# Patient Record
Sex: FEMALE | Race: WHITE | Employment: OTHER | URBAN - METROPOLITAN AREA
[De-identification: names, ages, dates, MRNs, and addresses within clinical notes are randomized per-mention and may not be internally consistent; named-entity substitution may affect disease eponyms.]

---

## 2017-11-08 ENCOUNTER — HOSPITAL ENCOUNTER (OUTPATIENT)
Dept: WOMENS IMAGING | Age: 67
Discharge: HOME | End: 2017-11-08
Attending: OBSTETRICS & GYNECOLOGY | Admitting: OBSTETRICS & GYNECOLOGY

## 2017-11-08 DIAGNOSIS — Z12.31 VISIT FOR SCREENING MAMMOGRAM: ICD-10-CM

## 2018-12-19 ENCOUNTER — HOSPITAL ENCOUNTER (OUTPATIENT)
Dept: WOMENS IMAGING | Age: 68
Discharge: HOME | End: 2018-12-19
Payer: MEDICARE

## 2018-12-19 DIAGNOSIS — Z12.39 BREAST CANCER SCREENING: ICD-10-CM

## 2018-12-19 PROCEDURE — 77063 BREAST TOMOSYNTHESIS BI: CPT

## 2020-05-25 ENCOUNTER — HOSPITAL ENCOUNTER (EMERGENCY)
Age: 70
Discharge: HOME | End: 2020-05-25
Payer: MEDICARE

## 2020-05-25 VITALS
SYSTOLIC BLOOD PRESSURE: 144 MMHG | RESPIRATION RATE: 12 BRPM | BODY MASS INDEX: 33.63 KG/M2 | HEART RATE: 82 BPM | DIASTOLIC BLOOD PRESSURE: 81 MMHG | WEIGHT: 189.82 LBS | OXYGEN SATURATION: 96 % | HEIGHT: 63 IN | TEMPERATURE: 99.5 F

## 2020-05-25 PROCEDURE — 99282 EMERGENCY DEPT VISIT SF MDM: CPT

## 2020-05-25 RX ORDER — METOCLOPRAMIDE 10 MG/1
10 TABLET ORAL
COMMUNITY
Start: 2019-04-17

## 2020-05-25 RX ORDER — CLOTRIMAZOLE 1 %
CREAM (GRAM) TOPICAL
Qty: 1 TUBE | Refills: 0 | Status: SHIPPED | OUTPATIENT
Start: 2020-05-25

## 2020-05-25 RX ORDER — CLINDAMYCIN HYDROCHLORIDE 150 MG/1
450 CAPSULE ORAL 3 TIMES DAILY
Qty: 90 CAPSULE | Refills: 0 | Status: SHIPPED | OUTPATIENT
Start: 2020-05-25 | End: 2020-06-04

## 2020-05-25 RX ORDER — NYSTATIN 100000 [USP'U]/G
POWDER TOPICAL
Qty: 1 BOTTLE | Refills: 0 | Status: SHIPPED | OUTPATIENT
Start: 2020-05-25

## 2020-05-25 RX ORDER — EMPAGLIFLOZIN 25 MG/1
25 TABLET, FILM COATED ORAL DAILY
COMMUNITY
Start: 2018-08-09 | End: 2020-06-19

## 2020-05-25 ASSESSMENT — ENCOUNTER SYMPTOMS
VOMITING: 0
NAUSEA: 0

## 2020-05-25 NOTE — ED PROVIDER NOTES
[address] [facility] ENCOUNTER        Pt Name: Gayle Hanson  MRN: [id number]  [place] 1950  Date of evaluation: 5/25/2020  Provider: JANETT Velazquez    This patient was not seen and evaluated by the attending physician No att. providers found. CHIEF COMPLAINT       Chief Complaint   Patient presents with    Rash     under right breast         HISTORY OF PRESENT ILLNESS  (Location/Symptom, Timing/Onset, Context/Setting, Quality, Duration,Modifying Factors, Severity.)   Gayle Hanson is a 77 y.o. female who presents to the emergencydepartment for itching rash under her right breast for the past 2 weeks. Denies prior episodes. Only is painful when she touches it. Reports it all started after she scratched a lesion on her skin that has been there for a long time. She denies any drainage. She denies fever or chills nausea or vomiting. She denies any new medications, soaps, products, or detergents. Nursing Notes were reviewed and I agree. OF SYSTEMS    (2-9 systems for level 4, 10 or more for level 5)     Review of Systems   Constitutional: Negative for chills and fever. Gastrointestinal: Negative for nausea and vomiting. Skin: Positive for rash. +itching  +skin lesion     Except as noted above the remainder of the review of systems was reviewed and negative. PAST MEDICAL HISTORY         Diagnosis Date    CAD (coronary artery disease)     Diabetes mellitus ([facility] [address]     Hypertension        SURGICAL HISTORY         Procedure Laterality Date    CORONARY ANGIOPLASTY WITH STENT PLACEMENT      TONSILLECTOMY         CURRENT MEDICATIONS       Previous Medications    ASPIRIN 81 MG CHEWABLE TABLET    Take 81 mg by mouth daily.     ATORVASTATIN (LIPITOR) 80 MG TABLET    Take 80 mg by mouth daily    DULAGLUTIDE 0.75 MG/0.5ML SOPN    Inject 0.75 mg into the skin every 7 days    EMPAGLIFLOZIN (JARDIANCE) 25 MG TABLET    Take 25 mg

## 2021-05-03 ENCOUNTER — HOSPITAL ENCOUNTER (OUTPATIENT)
Dept: WOMENS IMAGING | Age: 71
Discharge: HOME | End: 2021-05-03
Payer: MEDICARE

## 2021-05-03 DIAGNOSIS — Z12.31 BREAST CANCER SCREENING BY MAMMOGRAM: ICD-10-CM

## 2021-05-03 PROCEDURE — 77063 BREAST TOMOSYNTHESIS BI: CPT

## 2022-05-10 ENCOUNTER — HOSPITAL ENCOUNTER (OUTPATIENT)
Dept: WOMENS IMAGING | Age: 72
Discharge: HOME | End: 2022-05-10
Payer: MEDICARE

## 2022-05-10 DIAGNOSIS — Z12.31 VISIT FOR SCREENING MAMMOGRAM: ICD-10-CM

## 2022-05-10 PROCEDURE — 77063 BREAST TOMOSYNTHESIS BI: CPT

## 2022-07-27 ENCOUNTER — HOSPITAL ENCOUNTER (EMERGENCY)
Age: 72
Discharge: HOME | End: 2022-07-27
Payer: MEDICARE

## 2022-07-27 ENCOUNTER — APPOINTMENT (OUTPATIENT)
Dept: GENERAL RADIOLOGY | Age: 72
End: 2022-07-27
Payer: MEDICARE

## 2022-07-27 VITALS
WEIGHT: 177.47 LBS | SYSTOLIC BLOOD PRESSURE: 141 MMHG | BODY MASS INDEX: 31.45 KG/M2 | HEART RATE: 80 BPM | DIASTOLIC BLOOD PRESSURE: 84 MMHG | RESPIRATION RATE: 18 BRPM | HEIGHT: 63 IN | OXYGEN SATURATION: 97 % | TEMPERATURE: 99 F

## 2022-07-27 DIAGNOSIS — S60.222A CONTUSION OF LEFT HAND, INITIAL ENCOUNTER: ICD-10-CM

## 2022-07-27 DIAGNOSIS — S20.212A RIB CONTUSION, LEFT, INITIAL ENCOUNTER: ICD-10-CM

## 2022-07-27 DIAGNOSIS — S32.030A CLOSED COMPRESSION FRACTURE OF L3 LUMBAR VERTEBRA, INITIAL ENCOUNTER (HCC): Primary | ICD-10-CM

## 2022-07-27 PROCEDURE — 72100 X-RAY EXAM L-S SPINE 2/3 VWS: CPT

## 2022-07-27 PROCEDURE — 6370000000 HC RX 637 (ALT 250 FOR IP): Performed by: PHYSICIAN ASSISTANT

## 2022-07-27 PROCEDURE — 73130 X-RAY EXAM OF HAND: CPT

## 2022-07-27 PROCEDURE — 71101 X-RAY EXAM UNILAT RIBS/CHEST: CPT

## 2022-07-27 PROCEDURE — 99283 EMERGENCY DEPT VISIT LOW MDM: CPT

## 2022-07-27 RX ORDER — LIDOCAINE 50 MG/G
1 PATCH TOPICAL DAILY
Qty: 10 PATCH | Refills: 0 | Status: SHIPPED | OUTPATIENT
Start: 2022-07-27 | End: 2022-08-06

## 2022-07-27 RX ORDER — LIDOCAINE 4 G/G
1 PATCH TOPICAL ONCE
Status: DISCONTINUED | OUTPATIENT
Start: 2022-07-27 | End: 2022-07-27 | Stop reason: HOSPADM

## 2022-07-27 RX ORDER — HYDROCODONE BITARTRATE AND ACETAMINOPHEN 5; 325 MG/1; MG/1
1 TABLET ORAL EVERY 6 HOURS PRN
Qty: 20 TABLET | Refills: 0 | Status: SHIPPED | OUTPATIENT
Start: 2022-07-27 | End: 2022-08-01

## 2022-07-27 RX ORDER — HYDROCODONE BITARTRATE AND ACETAMINOPHEN 5; 325 MG/1; MG/1
1 TABLET ORAL ONCE
Status: COMPLETED | OUTPATIENT
Start: 2022-07-27 | End: 2022-07-27

## 2022-07-27 RX ADMIN — HYDROCODONE BITARTRATE AND ACETAMINOPHEN 1 TABLET: 5; 325 TABLET ORAL at 16:26

## 2022-07-27 ASSESSMENT — PAIN SCALES - GENERAL
PAINLEVEL_OUTOF10: 8
PAINLEVEL_OUTOF10: 8

## 2022-07-27 ASSESSMENT — PAIN DESCRIPTION - PAIN TYPE: TYPE: ACUTE PAIN

## 2022-07-27 ASSESSMENT — PAIN DESCRIPTION - FREQUENCY: FREQUENCY: CONTINUOUS

## 2022-07-27 ASSESSMENT — PAIN DESCRIPTION - LOCATION
LOCATION: BACK
LOCATION: BACK

## 2022-07-27 ASSESSMENT — PAIN DESCRIPTION - DESCRIPTORS: DESCRIPTORS: ACHING

## 2022-07-27 ASSESSMENT — PAIN - FUNCTIONAL ASSESSMENT: PAIN_FUNCTIONAL_ASSESSMENT: 0-10

## 2022-07-27 NOTE — DISCHARGE INSTRUCTIONS
Home in stable condition to use cold compresses, use the medications as prescribed, and monitor for gradual improvement over the course of about a week from the onset of symptoms. Call [facility] for further evaluation and treatment as needed. Return to the emergency department for any emergency worsening or concern.

## 2022-07-27 NOTE — ED PROVIDER NOTES
**ADVANCED PRACTICE PROVIDER, I HAVE EVALUATED THIS PATIENT**        [address]      Pt Name: Laureano Martin  SCI:[id number]  [place] 1950  Date of evaluation: 7/27/2022  Provider: Korey Foley PA-C      Chief Complaint:    Chief Complaint   Patient presents with    Fall     Pt states that she fell on her back Saturday while putting up curtains. Did not hit her head          Nursing Notes, Past Medical Hx, Past Surgical Hx, Social Hx, Allergies, and Family Hx were all reviewed and agreed with or any disagreements were addressed in the HPI.    HPI: (Location, Duration, Timing, Severity, Quality, Assoc Sx, Context, Modifying factors)    Chief Complaint of Accidental fall 4 days ago    This is a  71 y.o. female who presents stating that her back is the main complaint. She states that she had an accidental fall from a chair she was standing on on Saturday down to the kitchen floor which is hard. She has had some pain in the low mid back and ribs area since but it seems to be getting a bit worse over the last couple of days. No medication taken for this so far today. It is worse with sneezing or coughing and with changing positions and better at rest.  No pain in the chest.  No radiculopathy to extremities or any extremity acute loss of range of motion or strength or sensation or coordination. She also has bruising on the left hand. She has a small wound on the left shin that they have kept covered since the incident. She lives at home with her  who is also present in the room. Pain currently is moderately severe, achy, worse with changing positions or sneezing and better at rest.  Does not radiate other than as above. No head or neck pain or tenderness.     PastMedical/Surgical History:      Diagnosis Date    CAD (coronary artery disease)     Diabetes mellitus ([facility] [address]     Hypertension          Procedure Laterality Date CORONARY ANGIOPLASTY WITH STENT PLACEMENT      TONSILLECTOMY         Medications:  Previous Medications    ASPIRIN 81 MG CHEWABLE TABLET    Take 81 mg by mouth daily. ATORVASTATIN (LIPITOR) 80 MG TABLET    Take 80 mg by mouth daily    CLOTRIMAZOLE (LOTRIMIN) 1 % CREAM    Apply to affected area twice daily for 2 weeks    DULAGLUTIDE 0.75 MG/0.5ML SOPN    Inject 0.75 mg into the skin every 7 days    HYDROCHLOROTHIAZIDE (HYDRODIURIL) 25 MG TABLET    Take 25 mg by mouth daily. INSULIN GLARGINE, 2 UNIT DIAL, 300 UNIT/ML SOPN    Inject into the skin    LOSARTAN (COZAAR) 25 MG TABLET    Take 100 mg by mouth daily     METFORMIN (GLUCOPHAGE) 500 MG TABLET    Take 500 mg by mouth 2 times daily (with meals). METOCLOPRAMIDE (REGLAN) 10 MG TABLET    Take 10 mg by mouth    NYSTATIN (MYCOSTATIN) 132173 UNIT/GM POWDER    Apply topically 3 times daily to affected area for 2 weeks    OMEPRAZOLE (PRILOSEC) 20 MG CAPSULE    Take 20 mg by mouth daily    PAROXETINE (PAXIL) 20 MG TABLET    Take  by mouth every morning. Indications: not sure of dosage    TOLTERODINE (DETROL LA) 4 MG ER CAPSULE    Take 4 mg by mouth daily         Review of Systems:  (2-9 systems needed)  Review of Systems  Positive history as above, no dizziness or confusion syncope or near syncope headache or vision change neck pain or stiffness. No shortness of breath or chest pain or palpitations. No abdominal pain vomiting or diarrhea. No new onset incontinence or retention of bowel or bladder saddle numbness or tingling or foot drop. \"Positives and Pertinent negatives as per HPI\"    Physical Exam:  Physical Exam  Vitals and nursing note reviewed. Constitutional:       Appearance: Normal appearance. She is not diaphoretic. HENT:      Head: Normocephalic and atraumatic. Right Ear: External ear normal.      Left Ear: External ear normal.      Nose: Nose normal.   Eyes:      General:         Right eye: No discharge. Left eye: No discharge. Conjunctiva/sclera: Conjunctivae normal.   Cardiovascular:      Rate and Rhythm: Normal rate and regular rhythm. Pulses: Normal pulses. Heart sounds: Normal heart sounds. No murmur heard. No gallop. Pulmonary:      Effort: Pulmonary effort is normal. No respiratory distress. Breath sounds: Normal breath sounds. No wheezing, rhonchi or rales. Abdominal:      General: Bowel sounds are normal. There is no distension. Palpations: Abdomen is soft. Tenderness: There is no abdominal tenderness. There is no guarding or rebound. Musculoskeletal:      Cervical back: Normal range of motion and neck supple. Comments: Some moderate upper to mid L-spine tenderness that is nonspecific to a single spinus process, also tenderness in the left paraspinal musculature in this region and into the left ribs posteriorly without palpable deformity or any obvious bruising, no abrasions or erythema, no rash, no subcutaneous crepitus or emphysema. No sciatic notch tenderness. Range of motion mildly limited in the L-spine secondary to pain with trying to bend forward however patient able to sit and stand and change positions relatively easily. Negative straight leg raise bilaterally. Distal pulses 2+ bilaterally in radialis. Mild bruising ulnar aspect dorsally on left hand without palpable deformity. No snuffbox tenderness. Skin:     General: Skin is warm and dry. Capillary Refill: Capillary refill takes less than 2 seconds. Findings: No rash. Neurological:      Mental Status: She is alert and oriented to person, place, and time. Sensory: No sensory deficit. Motor: No weakness.       Coordination: Coordination normal.      Gait: Gait normal.   Psychiatric:         Mood and Affect: Mood normal.         Behavior: Behavior normal.       MEDICAL DECISION MAKING    Vitals:    Vitals:    07/27/22 1332 07/27/22 1626   BP: (!) 141/84    Pulse: 80    Resp: 18 18   Temp: 99 °F (37.2 °C) TempSrc: Oral    SpO2: 97%    Weight: 177 lb 7.5 oz (80.5 kg)    Height: 5' 3\" (1.6 m)        LABS:Labs Reviewed - No data to display     Remainder of labs reviewed and were negative at this time or not returned at the time of this note. RADIOLOGY:   Non-plain film images such as CT, Ultrasound and MRI are read by the radiologist. Judith Chacon PA-C have directly visualized the radiologic plain film image(s) with the below findings:      Interpretation per the Radiologist below, if available at the time of this note:    XR HAND LEFT (MIN 3 VIEWS)   Final Result   No acute osseous abnormality         XR LUMBAR SPINE (2-3 VIEWS)   Final Result   1. Mild superior endplate compression deformity L3 of uncertain age lumbar   spine MRI would be helpful to assess acuity   2. No acute lumbar spine abnormality   3. Multilevel degenerative changes         XR RIBS LEFT INCLUDE CHEST (MIN 3 VIEWS)   Final Result   No acute cardiopulmonary disease. No acute osseous abnormality of the left ribs. XR RIBS LEFT INCLUDE CHEST (MIN 3 VIEWS)    Result Date: 7/27/2022  EXAMINATION: 3 XRAY VIEWS OF THE LEFT RIBS WITH FRONTAL XRAY VIEW OF THE CHEST 7/27/2022 2:42 pm COMPARISON: None. HISTORY: ORDERING SYSTEM PROVIDED HISTORY: Injury TECHNOLOGIST PROVIDED HISTORY: Reason for exam:->Injury Reason for Exam: Injury FINDINGS: The cardiomediastinal silhouette is unremarkable. The lungs are clear. No infiltrate, pleural fluid or pneumothorax. Left rib series demonstrates no acute fracture or other osseous abnormality. No acute cardiopulmonary disease. No acute osseous abnormality of the left ribs.      XR LUMBAR SPINE (2-3 VIEWS)    Result Date: 7/27/2022  EXAMINATION: THREE XRAY VIEWS OF THE LUMBAR SPINE 7/27/2022 2:42 pm COMPARISON: 02/18/2016 HISTORY: ORDERING SYSTEM PROVIDED HISTORY: pain after fall TECHNOLOGIST PROVIDED HISTORY: Reason for exam:->pain after fall Reason for Exam: pain after fall FINDINGS: Alignment is anatomic. Multilevel degenerative disc disease and facet joint arthropathy are noted. Mild superior endplate compression deformity L3.     1. Mild superior endplate compression deformity L3 of uncertain age lumbar spine MRI would be helpful to assess acuity 2. No acute lumbar spine abnormality 3. Multilevel degenerative changes     XR HAND LEFT (MIN 3 VIEWS)    Result Date: 7/27/2022  EXAMINATION: THREE XRAY VIEWS OF THE LEFT HAND 7/27/2022 2:42 pm COMPARISON: None. HISTORY: ORDERING SYSTEM PROVIDED HISTORY: pain/bruising after a fall TECHNOLOGIST PROVIDED HISTORY: Reason for exam:->pain/bruising after a fall Reason for Exam: pain/bruising after a fall FINDINGS: The soft tissues are unremarkable. There is no fracture or dislocation. No focal destructive osseous lesion. No radiopaque foreign body is identified. No acute osseous abnormality          MEDICAL DECISION MAKING / ED COURSE:      PROCEDURES:   Procedures    None    Patient was given:  Medications   lidocaine 4 % external patch 1 patch (1 patch TransDERmal Patch Applied 7/27/22 1626)   HYDROcodone-acetaminophen (NORCO) 5-325 MG per tablet 1 tablet (1 tablet Oral Given 7/27/22 1626)     This patient presents as above and evaluation and treatment is begun here including medications ordered for comfort and appropriate imaging. Imaging now returns as above with no indication of acute cardiopulmonary disease including pneumothorax, no acute rib fracture. Positive for L3 superior endplate compression fracture noted by radiology. I did examined the images and it is definitely new since 2016, our most recent images of these areas. On repeat exam patient still doing pretty well. Medications not given by nursing yet at this time. However no new neurologic deficit or leg weakness or loss of sensation or movement including no saddle numbness or tingling or any radiating pain or symptoms.   Patient's symptoms and radiologic findings and physical exam findings are all correlated with her and the  at the bedside. She does have consistent with acute closed fracture of L3, rib contusion and hand contusion. Conservative home care and outpatient follow-up was also discussed and recommended to patient who verbalizes understanding and agreement with the above and the following discharge home plan. Nursing giving medicines now. The patient tolerated their visit well. I evaluated the patient. The physician was available for consultation as needed. The patient and / or the family were informed of the results of any tests, a time was given to answer questions, a plan was proposed and they agreed with plan. CLINICAL IMPRESSION:  1. Closed compression fracture of L3 lumbar vertebra, initial encounter ([facility] [address]    2. Rib contusion, left, initial encounter    3. Contusion of left hand, initial encounter        DISPOSITION Decision To Discharge 07/27/2022 04:28:14 PM      PATIENT REFERRED TO:  [facility], [place], [address]  Call   For further evaluation and treatment if symptoms not resolving or settling well over the course of about a week from the injury    DISCHARGE MEDICATIONS:  New Prescriptions    HYDROCODONE-ACETAMINOPHEN (NORCO) 5-325 MG PER TABLET    Take 1 tablet by mouth every 6 hours as needed for Pain for up to 5 days. Caution, causes drowsiness. Take appropriate care. LIDOCAINE (LIDODERM) 5 %    Place 1 patch onto the skin in the morning for 10 days. 12 hours on, 12 hours off. .       DISCONTINUED MEDICATIONS:  Discontinued Medications    No medications on file              (Please note the MDM and HPI sections of this note were completed with a voice recognition program.  Efforts were made to edit the dictations but occasionally words are mis-transcribed.)    Electronically signed, Chevy Olivares PA-C,          Chevy Olivares PA-C  07/28/22 [id number]  07/28/22 0038

## 2023-05-13 ENCOUNTER — HOSPITAL ENCOUNTER (OUTPATIENT)
Dept: WOMENS IMAGING | Age: 73
Discharge: HOME | End: 2023-05-13
Payer: MEDICARE

## 2023-05-13 DIAGNOSIS — Z12.31 BREAST CANCER SCREENING BY MAMMOGRAM: ICD-10-CM

## 2023-05-13 PROCEDURE — 77063 BREAST TOMOSYNTHESIS BI: CPT

## 2023-08-31 RX ORDER — FLUOXETINE 20 MG/5ML
SOLUTION ORAL
COMMUNITY
Start: 2022-08-04

## 2023-08-31 RX ORDER — DEXLANSOPRAZOLE 60 MG/1
60 CAPSULE, DELAYED RELEASE ORAL DAILY
COMMUNITY

## 2023-08-31 NOTE — PROGRESS NOTES
[address]  time__0730__________        Surgery time__0900__________    Take the following medications with a sip of water: Follow your MD/Surgeons pre-procedure instructions regarding your medications     Do not eat or drink anything after 12:00 midnight prior to your surgery. This includes water chewing gum, mints and ice chips. You may brush your teeth and gargle the morning of your surgery, but do not swallow the water     Please see your family doctor/pediatrician for a history and physical and/or concerning medications. H&P 9/1/23 CARRIE Verma    Bring any test results/reports from your physicians office. If you are under the care of a heart doctor or specialist doctor, please be aware that you may be asked to them for clearance    You may be asked to stop blood thinners such as Coumadin, Plavix, Fragmin, Lovenox, etc., or any anti-inflammatories such as:  Aspirin, Ibuprofen, Advil, Naproxen prior to your surgery. We also ask that you stop any OTC medications such as fish oil, vitamin E, glucosamine, garlic, Multivitamins, COQ 10, etc.    We ask that you do not smoke 24 hours prior to surgery  We ask that you do not  drink any alcoholic beverages 24 hours prior to surgery     You must make arrangements for a responsible adult to take you home after your surgery. For your safety you will not be allowed to leave alone or drive yourself home. Your surgery will be cancelled if you do not have a ride home. Also for your safety, it is strongly suggested that someone stay with you the first 24 hours after your surgery. A parent or legal guardian must accompany a child scheduled for surgery and plan to stay at the hospital until the child is discharged. Please do not bring other children with you. For your comfort, please wear simple loose fitting clothing to the hospital.  Please do not bring valuables.  Wear a short sleeve button down shirt or a

## 2023-09-08 ENCOUNTER — ANESTHESIA EVENT (OUTPATIENT)
Dept: SURGERY | Age: 73
End: 2023-09-08
Payer: MEDICARE

## 2023-09-08 NOTE — PRE-PROCEDURE INSTRUCTIONS
[address] [facility]  [phone]        Pre-Op Phone Call:     Patient Name: Kim Lanes     No relevant phone numbers on file. Home phone:  [phone]    Surgery Time:    9:40 AM     Arrival Time:  9396     Left extended Message:  Yes     Message left with:[id number]     Recent change in health status:  NA     Advised of transportation/ policy:  Yes     NPO policy reviewed: Yes     Advised to take morning heart/blood pressure medications with sips of water morning of surgery? Yes     Instructed to bring eye drops, photo identification, and insurance card day of surgery? Yes     Advised to wear short sleeved button down shirt (no T-shirt underneath):  Yes     Advised not to wear jewelry, hairpins, or pantyhose day of surgery? Yes     Advised not to wear make-up and to wash face day of surgery?   Yes    Remarks:        Electronically signed by:  Adrianne Prieto RN at 9/8/2023 9:21 AM

## 2023-09-11 ENCOUNTER — HOSPITAL ENCOUNTER (OUTPATIENT)
Age: 73
Setting detail: OUTPATIENT SURGERY
Discharge: HOME | End: 2023-09-11
Attending: OPHTHALMOLOGY | Admitting: OPHTHALMOLOGY
Payer: MEDICARE

## 2023-09-11 ENCOUNTER — ANESTHESIA (OUTPATIENT)
Dept: SURGERY | Age: 73
End: 2023-09-11
Payer: MEDICARE

## 2023-09-11 VITALS
BODY MASS INDEX: 31.01 KG/M2 | RESPIRATION RATE: 16 BRPM | OXYGEN SATURATION: 98 % | WEIGHT: 175 LBS | HEIGHT: 63 IN | HEART RATE: 73 BPM | SYSTOLIC BLOOD PRESSURE: 133 MMHG | TEMPERATURE: 97.3 F | DIASTOLIC BLOOD PRESSURE: 79 MMHG

## 2023-09-11 LAB
GLUCOSE BLD-MCNC: 108 MG/DL (ref 70–99)
GLUCOSE BLD-MCNC: 97 MG/DL (ref 70–99)
PERFORMED ON: ABNORMAL
PERFORMED ON: NORMAL

## 2023-09-11 PROCEDURE — 3700000000 HC ANESTHESIA ATTENDED CARE: Performed by: OPHTHALMOLOGY

## 2023-09-11 PROCEDURE — 7100000010 HC PHASE II RECOVERY - FIRST 15 MIN: Performed by: OPHTHALMOLOGY

## 2023-09-11 PROCEDURE — 3600000004 HC SURGERY LEVEL 4 BASE: Performed by: OPHTHALMOLOGY

## 2023-09-11 PROCEDURE — 2709999900 HC NON-CHARGEABLE SUPPLY: Performed by: OPHTHALMOLOGY

## 2023-09-11 PROCEDURE — 2500000003 HC RX 250 WO HCPCS: Performed by: OPHTHALMOLOGY

## 2023-09-11 PROCEDURE — 2580000003 HC RX 258: Performed by: OPHTHALMOLOGY

## 2023-09-11 PROCEDURE — 6370000000 HC RX 637 (ALT 250 FOR IP): Performed by: OPHTHALMOLOGY

## 2023-09-11 PROCEDURE — 3600000014 HC SURGERY LEVEL 4 ADDTL 15MIN: Performed by: OPHTHALMOLOGY

## 2023-09-11 PROCEDURE — 6360000002 HC RX W HCPCS: Performed by: OPHTHALMOLOGY

## 2023-09-11 PROCEDURE — V2787 ASTIGMATISM-CORRECT FUNCTION: HCPCS | Performed by: OPHTHALMOLOGY

## 2023-09-11 PROCEDURE — 6360000002 HC RX W HCPCS

## 2023-09-11 PROCEDURE — 3700000001 HC ADD 15 MINUTES (ANESTHESIA): Performed by: OPHTHALMOLOGY

## 2023-09-11 DEVICE — IMPLANTABLE DEVICE: Type: IMPLANTABLE DEVICE | Status: FUNCTIONAL

## 2023-09-11 RX ORDER — TETRACAINE HYDROCHLORIDE 5 MG/ML
1 SOLUTION OPHTHALMIC ONCE
Status: COMPLETED | OUTPATIENT
Start: 2023-09-11 | End: 2023-09-11

## 2023-09-11 RX ORDER — SODIUM CHLORIDE 9 MG/ML
INJECTION, SOLUTION INTRAVENOUS PRN
Status: CANCELLED | OUTPATIENT
Start: 2023-09-11

## 2023-09-11 RX ORDER — SODIUM CHLORIDE 0.9 % (FLUSH) 0.9 %
5-40 SYRINGE (ML) INJECTION EVERY 12 HOURS SCHEDULED
Status: CANCELLED | OUTPATIENT
Start: 2023-09-11

## 2023-09-11 RX ORDER — SODIUM CHLORIDE 0.9 % (FLUSH) 0.9 %
5-40 SYRINGE (ML) INJECTION PRN
Status: DISCONTINUED | OUTPATIENT
Start: 2023-09-11 | End: 2023-09-11 | Stop reason: HOSPADM

## 2023-09-11 RX ORDER — SODIUM CHLORIDE 0.9 % (FLUSH) 0.9 %
5-40 SYRINGE (ML) INJECTION PRN
Status: CANCELLED | OUTPATIENT
Start: 2023-09-11

## 2023-09-11 RX ORDER — SODIUM CHLORIDE 0.9 % (FLUSH) 0.9 %
5-40 SYRINGE (ML) INJECTION EVERY 12 HOURS SCHEDULED
Status: DISCONTINUED | OUTPATIENT
Start: 2023-09-11 | End: 2023-09-11 | Stop reason: HOSPADM

## 2023-09-11 RX ORDER — SODIUM CHLORIDE 9 MG/ML
INJECTION, SOLUTION INTRAVENOUS PRN
Status: DISCONTINUED | OUTPATIENT
Start: 2023-09-11 | End: 2023-09-11 | Stop reason: HOSPADM

## 2023-09-11 RX ORDER — PHENYLEPHRINE HYDROCHLORIDE 25 MG/ML
2 SOLUTION/ DROPS OPHTHALMIC ONCE
Status: COMPLETED | OUTPATIENT
Start: 2023-09-11 | End: 2023-09-11

## 2023-09-11 RX ORDER — ONDANSETRON 2 MG/ML
4 INJECTION INTRAMUSCULAR; INTRAVENOUS
Status: CANCELLED | OUTPATIENT
Start: 2023-09-11 | End: 2023-09-12

## 2023-09-11 RX ORDER — BRIMONIDINE TARTRATE 1.5 MG/ML
SOLUTION/ DROPS OPHTHALMIC
Status: COMPLETED | OUTPATIENT
Start: 2023-09-11 | End: 2023-09-11

## 2023-09-11 RX ORDER — MIDAZOLAM HYDROCHLORIDE 1 MG/ML
INJECTION INTRAMUSCULAR; INTRAVENOUS PRN
Status: DISCONTINUED | OUTPATIENT
Start: 2023-09-11 | End: 2023-09-11 | Stop reason: SDUPTHER

## 2023-09-11 RX ORDER — CIPROFLOXACIN HYDROCHLORIDE 3.5 MG/ML
1 SOLUTION/ DROPS TOPICAL SEE ADMIN INSTRUCTIONS
Status: DISCONTINUED | OUTPATIENT
Start: 2023-09-11 | End: 2023-09-11 | Stop reason: HOSPADM

## 2023-09-11 RX ORDER — TETRACAINE HYDROCHLORIDE 5 MG/ML
SOLUTION OPHTHALMIC
Status: COMPLETED | OUTPATIENT
Start: 2023-09-11 | End: 2023-09-11

## 2023-09-11 RX ORDER — TROPICAMIDE 10 MG/ML
2 SOLUTION/ DROPS OPHTHALMIC ONCE
Status: COMPLETED | OUTPATIENT
Start: 2023-09-11 | End: 2023-09-11

## 2023-09-11 RX ORDER — BALANCED SALT SOLUTION 6.4; .75; .48; .3; 3.9; 1.7 MG/ML; MG/ML; MG/ML; MG/ML; MG/ML; MG/ML
SOLUTION OPHTHALMIC
Status: COMPLETED | OUTPATIENT
Start: 2023-09-11 | End: 2023-09-11

## 2023-09-11 RX ADMIN — PHENYLEPHRINE HYDROCHLORIDE 2 DROP: 25 SOLUTION/ DROPS OPHTHALMIC at 09:05

## 2023-09-11 RX ADMIN — TROPICAMIDE 2 DROP: 10 SOLUTION/ DROPS OPHTHALMIC at 09:05

## 2023-09-11 RX ADMIN — TETRACAINE HYDROCHLORIDE 1 DROP: 5 SOLUTION OPHTHALMIC at 09:05

## 2023-09-11 RX ADMIN — POVIDONE-IODINE: 5 SOLUTION OPHTHALMIC at 09:09

## 2023-09-11 RX ADMIN — CIPROFLOXACIN 1 DROP: 3 SOLUTION OPHTHALMIC at 09:05

## 2023-09-11 RX ADMIN — MIDAZOLAM 2 MG: 1 INJECTION INTRAMUSCULAR; INTRAVENOUS at 09:54

## 2023-09-11 RX ADMIN — SODIUM CHLORIDE: 9 INJECTION, SOLUTION INTRAVENOUS at 09:09

## 2023-09-11 ASSESSMENT — PAIN SCALES - GENERAL
PAINLEVEL_OUTOF10: 0
PAINLEVEL_OUTOF10: 0

## 2023-09-11 ASSESSMENT — PAIN - FUNCTIONAL ASSESSMENT: PAIN_FUNCTIONAL_ASSESSMENT: 0-10

## 2023-09-11 NOTE — OP NOTE
instructions and follow-up care were arranged.        Electronically signed by: Amaya Fontaine MD,9/11/2023,10:04 AM

## 2023-09-11 NOTE — H&P
Date of Surgery Update:  Jere Clifton was seen, history and physical examination reviewed, and patient examined by me today. There have been no significant clinical changes since the completion of the previous history and physical. The surgical site was confirmed by the patient and me. I have presented reasonable alternatives to the patient's proposed care, treatment, and services. The discussion I have done encompassed risks, benefits, and side effects related to the alternatives and the risks related to not receiving the proposed care, treatment, and services. All questions answered. Patient wishes to proceed.      Electronically signed by: Ping Washburn MD,9/11/2023,10:04 AM

## 2023-09-11 NOTE — DISCHARGE INSTRUCTIONS
[address] [place], [address]   [phone]       Post-Operative Instructions for Day of Cataract Surgery    2023      Patient Name: Armando Terrazas  : 1950  MRN: [id number]    Use the Antibiotic Drop; one drop in the OPERATIVE EYE THREE more times today. Use the NSAID Drop ;one drop in the OPERATIVE EYE ONE more time today. Use the STEROID Drop: one drop in the OPERATIVE EYE THREE more times today. You may watch TV, walk, and do routine chores. Avoid heavy exertion or straining. Wear shield at bedtime for TWO nights. You may take Tylenol or Advil for mild irritation or pain. If you have pain worse than what Tylenol or Advil can manage, call your physician  If your next day appointment is in the afternoon, then use the Antibiotic, Anti-inflammatory, and Steroid drops once each that morning. Your next appointment is:                                   at:                                 location. Remember to bring your eye medications/kit to the office. General Post-Operative Instructions for Cataract Surgery    COMFORT - Your eye may feel irritated (scratchy, stinging, or achy) this is normal.   DIET - You may resume your regular diet, no alcohol for 24 hours. ACTIVITY - Do not lift anything over 25 pounds today, nothing over 50 pounds for the first week. No driving for 24 hours, Do not make any important decisions or sign legal documents for the first 24 hours. EYE CARE - Use your eye drops as instructed. Wash your hands before using your eye drops. Do not bump, rub, or touch the operative eye. No water in or around your eyes for 24 hours. You may shower from the shoulders down; You may wash your hair 24 hours following surgery. SHIELD - Wear the eye shield for 2 nights following surgery. VISION - You may experience off/on blurry vision today. Your vision will steadily improve over the next days.  Call your surgeon if you experience a

## 2023-09-14 NOTE — PROGRESS NOTES
[address]  time__0730__________        Surgery time__0900__________    Take the following medications with a sip of water: Follow your MD/Surgeons pre-procedure instructions regarding your medications     Do not eat or drink anything after 12:00 midnight prior to your surgery. This includes water chewing gum, mints and ice chips. You may brush your teeth and gargle the morning of your surgery, but do not swallow the water     Please see your family doctor/pediatrician for a history and physical and/or concerning medications. Bring any test results/reports from your physicians office. If you are under the care of a heart doctor or specialist doctor, please be aware that you may be asked to them for clearance    You may be asked to stop blood thinners such as Coumadin, Plavix, Fragmin, Lovenox, etc., or any anti-inflammatories such as:  Aspirin, Ibuprofen, Advil, Naproxen prior to your surgery. We also ask that you stop any OTC medications such as fish oil, vitamin E, glucosamine, garlic, Multivitamins, COQ 10, etc.    We ask that you do not smoke 24 hours prior to surgery  We ask that you do not  drink any alcoholic beverages 24 hours prior to surgery     You must make arrangements for a responsible adult to take you home after your surgery. For your safety you will not be allowed to leave alone or drive yourself home. Your surgery will be cancelled if you do not have a ride home. Also for your safety, it is strongly suggested that someone stay with you the first 24 hours after your surgery. A parent or legal guardian must accompany a child scheduled for surgery and plan to stay at the hospital until the child is discharged. Please do not bring other children with you. For your comfort, please wear simple loose fitting clothing to the hospital.  Please do not bring valuables.     Do not wear any make-up or nail polish on your fingers or

## 2023-09-22 ENCOUNTER — ANESTHESIA EVENT (OUTPATIENT)
Dept: SURGERY | Age: 73
End: 2023-09-22
Payer: MEDICARE

## 2023-09-22 NOTE — PRE-PROCEDURE INSTRUCTIONS
[address] [facility]  [phone]        Pre-Op Phone Call:     Patient Name: Uriel Syed     No relevant phone numbers on file. Home phone:  [phone]    Surgery Time:    9:00 AM     Arrival Time:  0730     Left extended Message:  Yes     Message left with:     Recent change in health status:  No     Advised of transportation/ policy:  Yes     NPO policy reviewed: Yes     Advised to take morning heart/blood pressure medications with sips of water morning of surgery? Yes     Instructed to bring eye drops, photo identification, and insurance card day of surgery? Yes     Advised to wear short sleeved button down shirt (no T-shirt underneath):  Yes     Advised not to wear jewelry, hairpins, or pantyhose day of surgery? Yes     Advised not to wear make-up and to wash face day of surgery?   Yes    Remarks:    Left a voicemail    Electronically signed by:  Jarvis Pereyra RN at 9/22/2023 9:54 AM

## 2023-09-25 ENCOUNTER — ANESTHESIA (OUTPATIENT)
Dept: SURGERY | Age: 73
End: 2023-09-25
Payer: MEDICARE

## 2023-09-25 ENCOUNTER — HOSPITAL ENCOUNTER (OUTPATIENT)
Age: 73
Setting detail: OUTPATIENT SURGERY
Discharge: HOME | End: 2023-09-25
Attending: OPHTHALMOLOGY | Admitting: OPHTHALMOLOGY
Payer: MEDICARE

## 2023-09-25 VITALS
RESPIRATION RATE: 17 BRPM | OXYGEN SATURATION: 99 % | HEIGHT: 63 IN | BODY MASS INDEX: 31.01 KG/M2 | SYSTOLIC BLOOD PRESSURE: 144 MMHG | TEMPERATURE: 96.9 F | DIASTOLIC BLOOD PRESSURE: 75 MMHG | HEART RATE: 56 BPM | WEIGHT: 175 LBS

## 2023-09-25 LAB
GLUCOSE BLD-MCNC: 117 MG/DL (ref 70–99)
GLUCOSE BLD-MCNC: 120 MG/DL (ref 70–99)
PERFORMED ON: ABNORMAL
PERFORMED ON: ABNORMAL

## 2023-09-25 PROCEDURE — 2709999900 HC NON-CHARGEABLE SUPPLY: Performed by: OPHTHALMOLOGY

## 2023-09-25 PROCEDURE — 3700000000 HC ANESTHESIA ATTENDED CARE: Performed by: OPHTHALMOLOGY

## 2023-09-25 PROCEDURE — V2787 ASTIGMATISM-CORRECT FUNCTION: HCPCS | Performed by: OPHTHALMOLOGY

## 2023-09-25 PROCEDURE — 6370000000 HC RX 637 (ALT 250 FOR IP): Performed by: OPHTHALMOLOGY

## 2023-09-25 PROCEDURE — 6360000002 HC RX W HCPCS

## 2023-09-25 PROCEDURE — 7100000010 HC PHASE II RECOVERY - FIRST 15 MIN: Performed by: OPHTHALMOLOGY

## 2023-09-25 PROCEDURE — 3600000004 HC SURGERY LEVEL 4 BASE: Performed by: OPHTHALMOLOGY

## 2023-09-25 PROCEDURE — 2500000003 HC RX 250 WO HCPCS: Performed by: OPHTHALMOLOGY

## 2023-09-25 PROCEDURE — 2580000003 HC RX 258: Performed by: ANESTHESIOLOGY

## 2023-09-25 DEVICE — IMPLANTABLE DEVICE: Type: IMPLANTABLE DEVICE | Site: ANTERIOR CHAMBER | Status: FUNCTIONAL

## 2023-09-25 RX ORDER — SODIUM CHLORIDE 0.9 % (FLUSH) 0.9 %
5-40 SYRINGE (ML) INJECTION PRN
Status: CANCELLED | OUTPATIENT
Start: 2023-09-25

## 2023-09-25 RX ORDER — LIDOCAINE HYDROCHLORIDE 10 MG/ML
INJECTION, SOLUTION EPIDURAL; INFILTRATION; INTRACAUDAL; PERINEURAL
Status: COMPLETED | OUTPATIENT
Start: 2023-09-25 | End: 2023-09-25

## 2023-09-25 RX ORDER — TETRACAINE HYDROCHLORIDE 5 MG/ML
1 SOLUTION OPHTHALMIC ONCE
Status: COMPLETED | OUTPATIENT
Start: 2023-09-25 | End: 2023-09-25

## 2023-09-25 RX ORDER — BRIMONIDINE TARTRATE 1.5 MG/ML
SOLUTION/ DROPS OPHTHALMIC
Status: COMPLETED | OUTPATIENT
Start: 2023-09-25 | End: 2023-09-25

## 2023-09-25 RX ORDER — CIPROFLOXACIN HYDROCHLORIDE 3.5 MG/ML
1 SOLUTION/ DROPS TOPICAL SEE ADMIN INSTRUCTIONS
Status: DISCONTINUED | OUTPATIENT
Start: 2023-09-25 | End: 2023-09-25 | Stop reason: HOSPADM

## 2023-09-25 RX ORDER — TETRACAINE HYDROCHLORIDE 5 MG/ML
SOLUTION OPHTHALMIC
Status: COMPLETED | OUTPATIENT
Start: 2023-09-25 | End: 2023-09-25

## 2023-09-25 RX ORDER — SODIUM CHLORIDE 0.9 % (FLUSH) 0.9 %
5-40 SYRINGE (ML) INJECTION EVERY 12 HOURS SCHEDULED
Status: CANCELLED | OUTPATIENT
Start: 2023-09-25

## 2023-09-25 RX ORDER — SODIUM CHLORIDE 9 MG/ML
INJECTION, SOLUTION INTRAVENOUS PRN
Status: CANCELLED | OUTPATIENT
Start: 2023-09-25

## 2023-09-25 RX ORDER — PHENYLEPHRINE HYDROCHLORIDE 25 MG/ML
2 SOLUTION/ DROPS OPHTHALMIC ONCE
Status: COMPLETED | OUTPATIENT
Start: 2023-09-25 | End: 2023-09-25

## 2023-09-25 RX ORDER — SODIUM CHLORIDE 0.9 % (FLUSH) 0.9 %
5-40 SYRINGE (ML) INJECTION EVERY 12 HOURS SCHEDULED
Status: DISCONTINUED | OUTPATIENT
Start: 2023-09-25 | End: 2023-09-25 | Stop reason: HOSPADM

## 2023-09-25 RX ORDER — SODIUM CHLORIDE 0.9 % (FLUSH) 0.9 %
5-40 SYRINGE (ML) INJECTION PRN
Status: DISCONTINUED | OUTPATIENT
Start: 2023-09-25 | End: 2023-09-25 | Stop reason: HOSPADM

## 2023-09-25 RX ORDER — TROPICAMIDE 10 MG/ML
2 SOLUTION/ DROPS OPHTHALMIC ONCE
Status: COMPLETED | OUTPATIENT
Start: 2023-09-25 | End: 2023-09-25

## 2023-09-25 RX ORDER — SODIUM CHLORIDE 9 MG/ML
INJECTION, SOLUTION INTRAVENOUS PRN
Status: DISCONTINUED | OUTPATIENT
Start: 2023-09-25 | End: 2023-09-25 | Stop reason: HOSPADM

## 2023-09-25 RX ORDER — BALANCED SALT SOLUTION 6.4; .75; .48; .3; 3.9; 1.7 MG/ML; MG/ML; MG/ML; MG/ML; MG/ML; MG/ML
SOLUTION OPHTHALMIC
Status: COMPLETED | OUTPATIENT
Start: 2023-09-25 | End: 2023-09-25

## 2023-09-25 RX ORDER — MIDAZOLAM HYDROCHLORIDE 1 MG/ML
INJECTION INTRAMUSCULAR; INTRAVENOUS PRN
Status: DISCONTINUED | OUTPATIENT
Start: 2023-09-25 | End: 2023-09-25 | Stop reason: SDUPTHER

## 2023-09-25 RX ADMIN — PHENYLEPHRINE HYDROCHLORIDE 2 DROP: 25 SOLUTION/ DROPS OPHTHALMIC at 07:55

## 2023-09-25 RX ADMIN — TETRACAINE HYDROCHLORIDE 1 DROP: 5 SOLUTION OPHTHALMIC at 07:55

## 2023-09-25 RX ADMIN — CIPROFLOXACIN 1 DROP: 3 SOLUTION OPHTHALMIC at 07:55

## 2023-09-25 RX ADMIN — SODIUM CHLORIDE: 9 INJECTION, SOLUTION INTRAVENOUS at 08:15

## 2023-09-25 RX ADMIN — MIDAZOLAM 1 MG: 1 INJECTION INTRAMUSCULAR; INTRAVENOUS at 09:10

## 2023-09-25 RX ADMIN — MIDAZOLAM 1 MG: 1 INJECTION INTRAMUSCULAR; INTRAVENOUS at 09:14

## 2023-09-25 RX ADMIN — TROPICAMIDE 2 DROP: 10 SOLUTION/ DROPS OPHTHALMIC at 07:55

## 2023-09-25 RX ADMIN — POVIDONE-IODINE: 5 SOLUTION OPHTHALMIC at 08:09

## 2023-09-25 ASSESSMENT — PAIN SCALES - GENERAL
PAINLEVEL_OUTOF10: 0
PAINLEVEL_OUTOF10: 0

## 2023-09-25 ASSESSMENT — PAIN - FUNCTIONAL ASSESSMENT: PAIN_FUNCTIONAL_ASSESSMENT: 0-10

## 2023-09-25 NOTE — OP NOTE
and follow-up care were arranged.        Electronically signed by: Dolores Dempsey MD,9/25/2023,9:22 AM

## 2023-09-25 NOTE — H&P
Date of Surgery Update:  Fred Grajeda was seen, history and physical examination reviewed, and patient examined by me today. There have been no significant clinical changes since the completion of the previous history and physical. The surgical site was confirmed by the patient and me. I have presented reasonable alternatives to the patient's proposed care, treatment, and services. The discussion I have done encompassed risks, benefits, and side effects related to the alternatives and the risks related to not receiving the proposed care, treatment, and services. All questions answered. Patient wishes to proceed.      Electronically signed by: Sissy Kearney MD,9/25/2023,9:22 AM
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
## 2023-09-25 NOTE — ANESTHESIA POSTPROCEDURE EVALUATION
Department of Anesthesiology  Postprocedure Note    Patient: Cherri Prater  MRN: [id number]  YOB: 1950  Date of evaluation: 9/25/2023      Procedure Summary     Date: 09/25/23 Room / Location: [address] [place] [address]    Anesthesia Start: 0908 Anesthesia Stop: 0999    Procedure: PHACOEMULSIFICATION WITH TORIC INTRAOCULAR LENS IMPLANT - LEFT EYE (Left: Eye) Diagnosis:       Nuclear sclerotic cataract of left eye      Astigmatism of left eye, unspecified type      (Nuclear sclerotic cataract of left eye [H25.12])      (Astigmatism of left eye, unspecified type [H52.202])    Surgeons: Crystal Cintron MD Responsible Provider: Jayshree Blue MD    Anesthesia Type: MAC ASA Status: 3          Anesthesia Type: No value filed.     Miguelina Phase I: Miguelina Score: 10    Miguelina Phase II: Miguelina Score: 10      Anesthesia Post Evaluation    Patient location during evaluation: bedside  Patient participation: complete - patient participated  Level of consciousness: awake and alert  Pain score: 0  Airway patency: patent  Nausea & Vomiting: no vomiting  Complications: no  Cardiovascular status: blood pressure returned to baseline  Respiratory status: acceptable  Hydration status: euvolemic  Pain management: adequate

## 2024-07-01 ENCOUNTER — HOSPITAL ENCOUNTER (OUTPATIENT)
Dept: WOMENS IMAGING | Age: 74
Discharge: HOME | End: 2024-07-01
Payer: MEDICARE

## 2024-07-01 DIAGNOSIS — Z12.31 BREAST CANCER SCREENING BY MAMMOGRAM: ICD-10-CM

## 2024-07-01 PROCEDURE — 77063 BREAST TOMOSYNTHESIS BI: CPT

## 2025-02-23 ENCOUNTER — APPOINTMENT (OUTPATIENT)
Dept: CT IMAGING | Age: 75
End: 2025-02-23
Payer: MEDICARE

## 2025-02-23 ENCOUNTER — HOSPITAL ENCOUNTER (EMERGENCY)
Age: 75
Discharge: HOME | End: 2025-02-23
Payer: MEDICARE

## 2025-02-23 VITALS
HEART RATE: 81 BPM | SYSTOLIC BLOOD PRESSURE: 195 MMHG | RESPIRATION RATE: 20 BRPM | DIASTOLIC BLOOD PRESSURE: 95 MMHG | TEMPERATURE: 98.1 F | OXYGEN SATURATION: 92 %
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
## 2025-02-23 DIAGNOSIS — M50.30 DEGENERATIVE DISC DISEASE, CERVICAL: ICD-10-CM
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
## 2025-02-23 DIAGNOSIS — S00.03XA SCALP HEMATOMA, INITIAL ENCOUNTER: ICD-10-CM

## 2025-02-23 PROCEDURE — 99284 EMERGENCY DEPT VISIT MOD MDM: CPT

## 2025-02-23 PROCEDURE — 70450 CT HEAD/BRAIN W/O DYE: CPT

## 2025-02-23 PROCEDURE — 72125 CT NECK SPINE W/O DYE: CPT

## 2025-02-23 RX ORDER — METHOCARBAMOL 750 MG/1
750 TABLET, FILM COATED ORAL 4 TIMES DAILY
Qty: 40 TABLET | Refills: 0 | Status: SHIPPED | OUTPATIENT
Start: 2025-02-23 | End: 2025-03-05
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
## 2025-02-24 ASSESSMENT — ENCOUNTER SYMPTOMS
VOMITING: 0
NAUSEA: 0

## 2025-02-24 NOTE — ED PROVIDER NOTES
[facility] EMERGENCY DEPARTMENT  EMERGENCY DEPARTMENT ENCOUNTER        Pt Name: Sophie Wells  MRN: [id number]  Birthdate 1950  Date of evaluation: 2/23/2025  Provider: RACHEAL Cummings - TYRONE  PCP: Henrik Smith MD  Note Started: 6:06 PM EST 2/24/25      GWEN. I have evaluated this patient.        CHIEF COMPLAINT       Chief Complaint   Patient presents with    Fall     Fall when taking out garbage. No LOC or on blood thinners.     Head Injury     Hematoma above left eye       HISTORY OF PRESENT ILLNESS: 1 or more Elements     History From: Patient, EMR review    Chief Complaint: Mechanical fall    Sophie Wells is a 74 y.o. female who presents to the emergency department for evaluation after mechanical fall.  She states that she was taking her garbage cans out at home when she tripped and fell resulting in a mechanical fall forward.  No blood thinning medications including Coumadin, Plavix, Xarelto, Eliquis.  No known bleeding or clotting disorders.  No LOC or amnesia.  Noted significant swelling above her left eye which prompted her to come to the emergency department for evaluation.  Patient does not drive at baseline therefore called EMS.  Denies headache or neck pain.  No paresthesias.    Nursing Notes were all reviewed and agreed with or any disagreements were addressed in the HPI.    REVIEW OF SYSTEMS :      Review of Systems   Gastrointestinal:  Negative for nausea and vomiting.   Musculoskeletal:  Negative for arthralgias and neck pain.   Skin:  Positive for wound.   Neurological:  Negative for headaches.   Hematological:  Does not bruise/bleed easily.   Psychiatric/Behavioral:  Negative for confusion.    All other systems reviewed and are negative.      Positives and Pertinent negatives as per HPI.     SURGICAL HISTORY     Past Surgical History:   Procedure Laterality Date    COLONOSCOPY      CORONARY ANGIOPLASTY WITH STENT PLACEMENT      X 2 2005, 2006    EYE

## 2025-04-20 ENCOUNTER — APPOINTMENT (OUTPATIENT)
Dept: GENERAL RADIOLOGY | Age: 75
End: 2025-04-20
Payer: MEDICARE

## 2025-04-20 ENCOUNTER — HOSPITAL ENCOUNTER (EMERGENCY)
Age: 75
Discharge: HOME | End: 2025-04-20
Payer: MEDICARE

## 2025-04-20 ENCOUNTER — APPOINTMENT (OUTPATIENT)
Dept: CT IMAGING | Age: 75
End: 2025-04-20
Payer: MEDICARE

## 2025-04-20 VITALS
RESPIRATION RATE: 19 BRPM | TEMPERATURE: 97.8 F | BODY MASS INDEX: 31.99 KG/M2 | DIASTOLIC BLOOD PRESSURE: 86 MMHG | WEIGHT: 180.56 LBS | OXYGEN SATURATION: 98 % | HEART RATE: 64 BPM | SYSTOLIC BLOOD PRESSURE: 191 MMHG | HEIGHT: 63 IN
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
## 2025-04-20 DIAGNOSIS — S16.1XXA ACUTE STRAIN OF NECK MUSCLE, INITIAL ENCOUNTER: Primary | ICD-10-CM

## 2025-04-20 DIAGNOSIS — S49.92XA INJURY OF LEFT SHOULDER, INITIAL ENCOUNTER: ICD-10-CM

## 2025-04-20 PROCEDURE — 73030 X-RAY EXAM OF SHOULDER: CPT

## 2025-04-20 PROCEDURE — 96372 THER/PROPH/DIAG INJ SC/IM: CPT

## 2025-04-20 PROCEDURE — 72125 CT NECK SPINE W/O DYE: CPT

## 2025-04-20 PROCEDURE — 6370000000 HC RX 637 (ALT 250 FOR IP): Performed by: PHYSICIAN ASSISTANT

## 2025-04-20 PROCEDURE — 99284 EMERGENCY DEPT VISIT MOD MDM: CPT

## 2025-04-20 PROCEDURE — 72100 X-RAY EXAM L-S SPINE 2/3 VWS: CPT

## 2025-04-20 PROCEDURE — 6360000002 HC RX W HCPCS: Performed by: PHYSICIAN ASSISTANT

## 2025-04-20 RX ORDER — LIDOCAINE 4 G/G
1 PATCH TOPICAL DAILY
Status: DISCONTINUED | OUTPATIENT
Start: 2025-04-20 | End: 2025-04-20 | Stop reason: HOSPADM

## 2025-04-20 RX ORDER — ACETAMINOPHEN 325 MG/1
650 TABLET ORAL ONCE
Status: COMPLETED | OUTPATIENT
Start: 2025-04-20 | End: 2025-04-20

## 2025-04-20 RX ORDER — LIDOCAINE 4 G/G
1 PATCH TOPICAL DAILY
Qty: 30 PATCH | Refills: 0 | Status: SHIPPED | OUTPATIENT
Start: 2025-04-20 | End: 2025-05-20

## 2025-04-20 RX ORDER — KETOROLAC TROMETHAMINE 30 MG/ML
30 INJECTION, SOLUTION INTRAMUSCULAR; INTRAVENOUS ONCE
Status: COMPLETED | OUTPATIENT
Start: 2025-04-20 | End: 2025-04-20

## 2025-04-20 RX ADMIN — KETOROLAC TROMETHAMINE 30 MG: 30 INJECTION, SOLUTION INTRAMUSCULAR at 10:03

## 2025-04-20 RX ADMIN — ACETAMINOPHEN 650 MG: 325 TABLET ORAL at 08:17

## 2025-04-20 ASSESSMENT — PAIN SCALES - GENERAL: PAINLEVEL_OUTOF10: 7

## 2025-04-20 ASSESSMENT — PAIN DESCRIPTION - LOCATION: LOCATION: NECK

## 2025-04-20 ASSESSMENT — PAIN DESCRIPTION - ORIENTATION: ORIENTATION: LEFT

## 2025-04-20 ASSESSMENT — PAIN - FUNCTIONAL ASSESSMENT: PAIN_FUNCTIONAL_ASSESSMENT: 0-10

## 2025-04-20 NOTE — ED NOTES
Pt confirmed d/c paperwork has correct name. Discharge and education instructions reviewed with patient. Teach-back successful.  Pt verbalized understanding and denied questions at this time. No acute distress noted. Patient instructed to follow-up as noted - return to emergency department if symptoms worsen. Patient verbalized understanding. Discharged per EDMD with discharge instructions. Pt discharged to private vehicle. Patient stable upon departure. Thanked patient for choosing [facility] for care. Provider aware of patient pain at time of discharge.

## 2025-04-20 NOTE — ED TRIAGE NOTES
Pt brought in by EMS from home. Pt lost balance when reaching for TV remote. Pt says she thinks she hurt her left side of her neck and shoulder. Pt has baseline chronic neck pain but says its worse. Denies hitting head, LOC, or blood thinners. Pt is independent baseline. Pt did not take BP meds this morning.

## 2025-04-20 NOTE — DISCHARGE INSTRUCTIONS
Recommend tylenol and ibuprofen as needed for pain. Can choose one medication short term or alterate between both. Recommend topical lidocaine patches for local relief as well.  Recommend ice for evaluation.  If you feel as if your muscles are spasmed or tight you can try heat and gentle stretching.  X-ray showed degenerative changes to your cervical spine and shoulder, no fractures.  If you begin with headache, vision changes, vomiting, new numbness/tingling/weakness or any other concerning symptoms please seek reevaluation. Your blood pressure is elevated in the ED, go take your medications and follow up with your primary care doctor.

## 2025-04-20 NOTE — ED PROVIDER NOTES
[facility] EMERGENCY DEPARTMENT  EMERGENCY DEPARTMENT ENCOUNTER        Pt Name: Sophie Wells  MRN: [id number]  Birthdate 1950  Date of evaluation: 4/20/2025  Provider: Ana Soto PA-C  PCP: Henrik Simth MD  Note Started: 8:02 AM EDT 4/20/25      GWEN. I have evaluated this patient.        CHIEF COMPLAINT       Chief Complaint   Patient presents with    Fall     Pt brought in by EMS from home. Pt lost balance when reaching for TV remote. Pt says she thinks she hurt her left side of her neck and shoulder. Pt has baseline chronic neck pain but says its worse. Denies hitting head, LOC, or blood thinners. Pt is independent baseline. Pt did not take BP meds this morning.        HISTORY OF PRESENT ILLNESS: 1 or more Elements     History From: patient  Limitations to history : None    Sophie Wells is a 74 y.o. female who presents to the emergency department by EMS following a fall.  Patient was laying on the couch this morning.  She reached over for the remote control , lost her balance and fell.  When she fell she hurt her left shoulder and left side of her neck.  She has been dealing with neck pain for the past couple days, pain is now worsened in severity. She denies hitting her head, loss of consciousness.  She is not anticoagulated.  Denies any extremity numbness/tingling/weakness.  Denies any dizziness/vertigo.  Denies any chest pain, shortness of breath.  She denies any other injuries. No other complaints at this time.     Nursing Notes were all reviewed and agreed with or any disagreements were addressed in the HPI.    REVIEW OF SYSTEMS :      Review of Systems    Positives and Pertinent negatives as per HPI.     SURGICAL HISTORY     Past Surgical History:   Procedure Laterality Date    COLONOSCOPY      CORONARY ANGIOPLASTY WITH STENT PLACEMENT      X 2 2005, 2006    EYE SURGERY Right 9/11/2023    PHACOEMULSIFICATION WITH TORIC INTRAOCULAR LENS IMPLANT - RIGHT EYE

## 2025-05-06 ENCOUNTER — HOSPITAL ENCOUNTER (EMERGENCY)
Age: 75
Discharge: HOME | End: 2025-05-06
Attending: EMERGENCY MEDICINE
Payer: MEDICARE

## 2025-05-06 VITALS
DIASTOLIC BLOOD PRESSURE: 94 MMHG | RESPIRATION RATE: 18 BRPM | TEMPERATURE: 97.8 F | OXYGEN SATURATION: 97 % | BODY MASS INDEX: 30.94 KG/M2 | SYSTOLIC BLOOD PRESSURE: 176 MMHG | HEIGHT: 63 IN | HEART RATE: 80 BPM | WEIGHT: 174.6 LBS

## 2025-05-06 DIAGNOSIS — M54.2 NECK PAIN: Primary | ICD-10-CM

## 2025-05-06 PROCEDURE — 99283 EMERGENCY DEPT VISIT LOW MDM: CPT

## 2025-05-06 PROCEDURE — 6370000000 HC RX 637 (ALT 250 FOR IP): Performed by: EMERGENCY MEDICINE

## 2025-05-06 RX ORDER — LIDOCAINE 4 G/G
1 PATCH TOPICAL DAILY
Status: DISCONTINUED | OUTPATIENT
Start: 2025-05-06 | End: 2025-05-06 | Stop reason: HOSPADM

## 2025-05-06 RX ORDER — LIDOCAINE 50 MG/G
1 PATCH TOPICAL DAILY
Qty: 10 PATCH | Refills: 0 | Status: SHIPPED | OUTPATIENT
Start: 2025-05-06 | End: 2025-05-16

## 2025-05-06 RX ORDER — IBUPROFEN 600 MG/1
600 TABLET, FILM COATED ORAL ONCE
Status: COMPLETED | OUTPATIENT
Start: 2025-05-06 | End: 2025-05-06

## 2025-05-06 RX ADMIN — IBUPROFEN 600 MG: 600 TABLET, FILM COATED ORAL at 08:02

## 2025-05-06 ASSESSMENT — PAIN SCALES - GENERAL
PAINLEVEL_OUTOF10: 8
PAINLEVEL_OUTOF10: 8

## 2025-05-06 ASSESSMENT — PAIN DESCRIPTION - LOCATION: LOCATION: NECK

## 2025-05-06 ASSESSMENT — PAIN - FUNCTIONAL ASSESSMENT: PAIN_FUNCTIONAL_ASSESSMENT: 0-10

## 2025-05-06 NOTE — ED PROVIDER NOTES
[facility] EMERGENCY DEPARTMENT    CHIEF COMPLAINT  Neck Pain (Pt was seen here on 4/20 after falling and told to come back if things haven't gotten better. Pt states she still has pain.)       HISTORY OF PRESENT ILLNESS  Sophie Wells is a 74 y.o. female who presents to the ED complaining of neck pain. This is a subacute complaint since a recent fall last month. She was seen in this ED at that time and underwent a CT C spine that was negative. Since then, she's had intermittent episodes of pain. The pain usually starts at the left shoulder but also sometimes in the midline. Presently, pain is ~7-8/10 in intensity. Sometimes worse when she lays in particular positions in bed. Has previously tried Tylenol and Coty Back and Body. Has also tried Voltaren gel, heating pads, and cold packs. Hasn't tried anything today. No new recent trauma. No focal weakness or numbness. No fever.     I have reviewed the following from the nursing documentation:    Past Medical History:   Diagnosis Date    CAD (coronary artery disease)     Compression fx, lumbar spine (HCC)     L3    Diabetes mellitus (HCC)     Hypertension      Past Surgical History:   Procedure Laterality Date    COLONOSCOPY      CORONARY ANGIOPLASTY WITH STENT PLACEMENT      X 2 2005, 2006    EYE SURGERY Right 9/11/2023    PHACOEMULSIFICATION WITH TORIC INTRAOCULAR LENS IMPLANT - RIGHT EYE performed by Ethan Marte MD at [facility]    EYE SURGERY Left 9/25/2023    PHACOEMULSIFICATION WITH TORIC INTRAOCULAR LENS IMPLANT - LEFT EYE performed by Ethan Marte MD at [facility]    TONSILLECTOMY       History reviewed. No pertinent family history.  Social History     Socioeconomic History    Marital status:      Spouse name: Not on file    Number of children: Not on file    Years of education: Not on file    Highest education level: Not on file   Occupational History    Not on file   Tobacco Use    Smoking status: Never

## 2025-05-06 NOTE — DISCHARGE INSTRUCTIONS
Dear Sophie Wells,     Thank you for the privilege of caring for you today in the Emergency Department.     Try ibuprofen 600 mg every 6 hours. Topical lidocaine patches. Stretching exercises.     How we look for dangerous causes of back pain    We get valuable information from the history and physical exam.  We carefully figure out what the pain is like, and we look for risk factors for getting a spinal infection or other emergent spinal condition.  We also do a good thorough neurological exam.  We may do labs and imaging, but not always.  If your risk factors are low and your neurological exam is normal, the chances of an infection or other emergent spinal condition are very low.    For every 100 patients like you, with a good exam and few risk factors:   99 will improve without problems  1 might get worse later  In that 1 person who gets worse, if he or she waits too long to come back, there could be damage to the spinal cord that could cause problems with walking, difficulty with urine and stool control, numbness, weakness, or paralysis  If you get worse, we want you to come back to us and get a follow-up exam    Please return to the emergency department if you develop fever, weakness, numbness, difficulty controlling your urine or stool, worsening pain, or numbness.     Regards,     Royal Adams MD, FACEP

## 2025-07-05 ENCOUNTER — HOSPITAL ENCOUNTER (INPATIENT)
Age: 75
LOS: 2 days | Discharge: HOME | DRG: 373 | End: 2025-07-07
Attending: EMERGENCY MEDICINE
Payer: MEDICARE

## 2025-07-05 ENCOUNTER — APPOINTMENT (OUTPATIENT)
Dept: GENERAL RADIOLOGY | Age: 75
DRG: 373 | End: 2025-07-05
Payer: MEDICARE

## 2025-07-05 ENCOUNTER — APPOINTMENT (OUTPATIENT)
Dept: CT IMAGING | Age: 75
DRG: 373 | End: 2025-07-05
Payer: MEDICARE

## 2025-07-05 DIAGNOSIS — K52.9 ILEITIS: Primary | ICD-10-CM

## 2025-07-05 DIAGNOSIS — R07.9 CHEST PAIN, UNSPECIFIED TYPE: ICD-10-CM

## 2025-07-05 PROBLEM — R10.9 ABDOMINAL PAIN: Status: ACTIVE | Noted: 2025-07-05

## 2025-07-05 LAB
ALBUMIN SERPL-MCNC: 3.5 G/DL (ref 3.4–5)
ALBUMIN/GLOB SERPL: 1.3 {RATIO} (ref 1.1–2.2)
ALP SERPL-CCNC: 111 U/L (ref 40–129)
ALT SERPL-CCNC: 20 U/L (ref 10–40)
ANION GAP SERPL CALCULATED.3IONS-SCNC: 11 MMOL/L (ref 3–16)
AST SERPL-CCNC: 22 U/L (ref 15–37)
BASOPHILS # BLD: 0 K/UL (ref 0–0.2)
BASOPHILS NFR BLD: 0.3 %
BILIRUB SERPL-MCNC: 1 MG/DL (ref 0–1)
BILIRUB UR QL STRIP.AUTO: NEGATIVE
BUN SERPL-MCNC: 13 MG/DL (ref 7–20)
CALCIUM SERPL-MCNC: 9 MG/DL (ref 8.3–10.6)
CHLORIDE SERPL-SCNC: 103 MMOL/L (ref 99–110)
CLARITY UR: CLEAR
CO2 SERPL-SCNC: 22 MMOL/L (ref 21–32)
COLOR UR: YELLOW
CREAT SERPL-MCNC: 0.8 MG/DL (ref 0.6–1.2)
D-DIMER QUANTITATIVE: 1.43 UG/ML FEU (ref 0–0.6)
DEPRECATED RDW RBC AUTO: 14.8 % (ref 12.4–15.4)
EOSINOPHIL # BLD: 0 K/UL (ref 0–0.6)
EOSINOPHIL NFR BLD: 0.3 %
FLUAV + FLUBV AG NOSE IA.RAPID: NOT DETECTED
FLUAV + FLUBV AG NOSE IA.RAPID: NOT DETECTED
GFR SERPLBLD CREATININE-BSD FMLA CKD-EPI: 77 ML/MIN/{1.73_M2}
GLUCOSE BLD-MCNC: 103 MG/DL (ref 70–99)
GLUCOSE SERPL-MCNC: 127 MG/DL (ref 70–99)
GLUCOSE UR STRIP.AUTO-MCNC: 500 MG/DL
HCT VFR BLD AUTO: 46.7 % (ref 36–48)
HGB BLD-MCNC: 15.3 G/DL (ref 12–16)
HGB UR QL STRIP.AUTO: NEGATIVE
KETONES UR STRIP.AUTO-MCNC: NEGATIVE MG/DL
LEUKOCYTE ESTERASE UR QL STRIP.AUTO: NEGATIVE
LIPASE SERPL-CCNC: 16 U/L (ref 13–60)
LYMPHOCYTES # BLD: 0.7 K/UL (ref 1–5.1)
LYMPHOCYTES NFR BLD: 8.8 %
MCH RBC QN AUTO: 28.8 PG (ref 26–34)
MCHC RBC AUTO-ENTMCNC: 32.8 G/DL (ref 31–36)
MCV RBC AUTO: 87.7 FL (ref 80–100)
MONOCYTES # BLD: 0.8 K/UL (ref 0–1.3)
MONOCYTES NFR BLD: 11.2 %
NEUTROPHILS # BLD: 6 K/UL (ref 1.7–7.7)
NEUTROPHILS NFR BLD: 79.4 %
NITRITE UR QL STRIP.AUTO: NEGATIVE
NT-PROBNP SERPL-MCNC: 55 PG/ML (ref 0–449)
PERFORMED ON: ABNORMAL
PH UR STRIP.AUTO: 5 [PH] (ref 5–8)
PLATELET # BLD AUTO: 187 K/UL (ref 135–450)
PMV BLD AUTO: 7.4 FL (ref 5–10.5)
POTASSIUM SERPL-SCNC: 3.9 MMOL/L (ref 3.5–5.1)
PROT SERPL-MCNC: 6.3 G/DL (ref 6.4–8.2)
PROT UR STRIP.AUTO-MCNC: NEGATIVE MG/DL
RBC # BLD AUTO: 5.33 M/UL (ref 4–5.2)
SARS-COV-2 RDRP RESP QL NAA+PROBE: NOT DETECTED
SODIUM SERPL-SCNC: 136 MMOL/L (ref 136–145)
SP GR UR STRIP.AUTO: 1.06 (ref 1–1.03)
TROPONIN, HIGH SENSITIVITY: 9 NG/L (ref 0–14)
TROPONIN, HIGH SENSITIVITY: <6 NG/L (ref 0–14)
UA COMPLETE W REFLEX CULTURE PNL UR: ABNORMAL
UA DIPSTICK W REFLEX MICRO PNL UR: ABNORMAL
URN SPEC COLLECT METH UR: ABNORMAL
UROBILINOGEN UR STRIP-ACNC: 1 E.U./DL
WBC # BLD AUTO: 7.5 K/UL (ref 4–11)

## 2025-07-05 PROCEDURE — 2580000003 HC RX 258

## 2025-07-05 PROCEDURE — 80053 COMPREHEN METABOLIC PANEL: CPT

## 2025-07-05 PROCEDURE — 99285 EMERGENCY DEPT VISIT HI MDM: CPT

## 2025-07-05 PROCEDURE — 2500000003 HC RX 250 WO HCPCS

## 2025-07-05 PROCEDURE — 93005 ELECTROCARDIOGRAM TRACING: CPT | Performed by: EMERGENCY MEDICINE

## 2025-07-05 PROCEDURE — 6370000000 HC RX 637 (ALT 250 FOR IP)

## 2025-07-05 PROCEDURE — 83880 ASSAY OF NATRIURETIC PEPTIDE: CPT

## 2025-07-05 PROCEDURE — 74177 CT ABD & PELVIS W/CONTRAST: CPT

## 2025-07-05 PROCEDURE — 71045 X-RAY EXAM CHEST 1 VIEW: CPT

## 2025-07-05 PROCEDURE — 81003 URINALYSIS AUTO W/O SCOPE: CPT

## 2025-07-05 PROCEDURE — 6360000004 HC RX CONTRAST MEDICATION: Performed by: PHYSICIAN ASSISTANT

## 2025-07-05 PROCEDURE — 2500000003 HC RX 250 WO HCPCS: Performed by: PHYSICIAN ASSISTANT

## 2025-07-05 PROCEDURE — 84484 ASSAY OF TROPONIN QUANT: CPT

## 2025-07-05 PROCEDURE — 1200000000 HC SEMI PRIVATE

## 2025-07-05 PROCEDURE — 6370000000 HC RX 637 (ALT 250 FOR IP): Performed by: PHYSICIAN ASSISTANT

## 2025-07-05 PROCEDURE — 85025 COMPLETE CBC W/AUTO DIFF WBC: CPT

## 2025-07-05 PROCEDURE — 87502 INFLUENZA DNA AMP PROBE: CPT

## 2025-07-05 PROCEDURE — 71260 CT THORAX DX C+: CPT

## 2025-07-05 PROCEDURE — 6360000002 HC RX W HCPCS

## 2025-07-05 PROCEDURE — 85379 FIBRIN DEGRADATION QUANT: CPT

## 2025-07-05 PROCEDURE — 87635 SARS-COV-2 COVID-19 AMP PRB: CPT

## 2025-07-05 PROCEDURE — 6360000002 HC RX W HCPCS: Performed by: PHYSICIAN ASSISTANT

## 2025-07-05 PROCEDURE — 36415 COLL VENOUS BLD VENIPUNCTURE: CPT

## 2025-07-05 PROCEDURE — 83690 ASSAY OF LIPASE: CPT

## 2025-07-05 PROCEDURE — 96374 THER/PROPH/DIAG INJ IV PUSH: CPT

## 2025-07-05 RX ORDER — FLUOXETINE HYDROCHLORIDE 40 MG/1
40 CAPSULE ORAL DAILY
COMMUNITY

## 2025-07-05 RX ORDER — POTASSIUM CHLORIDE 1500 MG/1
40 TABLET, EXTENDED RELEASE ORAL PRN
Status: DISCONTINUED | OUTPATIENT
Start: 2025-07-05 | End: 2025-07-07 | Stop reason: HOSPADM

## 2025-07-05 RX ORDER — ONDANSETRON 2 MG/ML
4 INJECTION INTRAMUSCULAR; INTRAVENOUS EVERY 6 HOURS PRN
Status: DISCONTINUED | OUTPATIENT
Start: 2025-07-05 | End: 2025-07-07 | Stop reason: HOSPADM

## 2025-07-05 RX ORDER — IOPAMIDOL 755 MG/ML
75 INJECTION, SOLUTION INTRAVASCULAR
Status: COMPLETED | OUTPATIENT
Start: 2025-07-05 | End: 2025-07-05

## 2025-07-05 RX ORDER — ACETAMINOPHEN 325 MG/1
650 TABLET ORAL EVERY 6 HOURS PRN
Status: DISCONTINUED | OUTPATIENT
Start: 2025-07-05 | End: 2025-07-07 | Stop reason: HOSPADM

## 2025-07-05 RX ORDER — ONDANSETRON 4 MG/1
4 TABLET, ORALLY DISINTEGRATING ORAL EVERY 8 HOURS PRN
Status: DISCONTINUED | OUTPATIENT
Start: 2025-07-05 | End: 2025-07-07 | Stop reason: HOSPADM

## 2025-07-05 RX ORDER — ENOXAPARIN SODIUM 100 MG/ML
40 INJECTION SUBCUTANEOUS DAILY
Status: DISCONTINUED | OUTPATIENT
Start: 2025-07-06 | End: 2025-07-07 | Stop reason: HOSPADM

## 2025-07-05 RX ORDER — TROSPIUM CHLORIDE 20 MG/1
20 TABLET, FILM COATED ORAL
Status: DISCONTINUED | OUTPATIENT
Start: 2025-07-05 | End: 2025-07-07 | Stop reason: HOSPADM

## 2025-07-05 RX ORDER — ACETAMINOPHEN 650 MG/1
650 SUPPOSITORY RECTAL EVERY 6 HOURS PRN
Status: DISCONTINUED | OUTPATIENT
Start: 2025-07-05 | End: 2025-07-07 | Stop reason: HOSPADM

## 2025-07-05 RX ORDER — POLYETHYLENE GLYCOL 3350 17 G/17G
17 POWDER, FOR SOLUTION ORAL DAILY PRN
Status: DISCONTINUED | OUTPATIENT
Start: 2025-07-05 | End: 2025-07-07 | Stop reason: HOSPADM

## 2025-07-05 RX ORDER — GABAPENTIN 300 MG/1
300 CAPSULE ORAL 3 TIMES DAILY PRN
COMMUNITY
Start: 2025-05-14 | End: 2025-09-11

## 2025-07-05 RX ORDER — ATORVASTATIN CALCIUM 80 MG/1
80 TABLET, FILM COATED ORAL DAILY
Status: DISCONTINUED | OUTPATIENT
Start: 2025-07-05 | End: 2025-07-07 | Stop reason: HOSPADM

## 2025-07-05 RX ORDER — FLUOXETINE 20 MG/5ML
20 SOLUTION ORAL DAILY
Status: DISCONTINUED | OUTPATIENT
Start: 2025-07-05 | End: 2025-07-05

## 2025-07-05 RX ORDER — METRONIDAZOLE 500 MG/100ML
500 INJECTION, SOLUTION INTRAVENOUS EVERY 8 HOURS
Status: DISCONTINUED | OUTPATIENT
Start: 2025-07-05 | End: 2025-07-07 | Stop reason: HOSPADM

## 2025-07-05 RX ORDER — SODIUM CHLORIDE 0.9 % (FLUSH) 0.9 %
5-40 SYRINGE (ML) INJECTION EVERY 12 HOURS SCHEDULED
Status: DISCONTINUED | OUTPATIENT
Start: 2025-07-05 | End: 2025-07-07 | Stop reason: HOSPADM

## 2025-07-05 RX ORDER — ASPIRIN 81 MG/1
81 TABLET, CHEWABLE ORAL DAILY
Status: DISCONTINUED | OUTPATIENT
Start: 2025-07-05 | End: 2025-07-07 | Stop reason: HOSPADM

## 2025-07-05 RX ORDER — POTASSIUM CHLORIDE 7.45 MG/ML
10 INJECTION INTRAVENOUS PRN
Status: DISCONTINUED | OUTPATIENT
Start: 2025-07-05 | End: 2025-07-07 | Stop reason: HOSPADM

## 2025-07-05 RX ORDER — METRONIDAZOLE 500 MG/1
500 TABLET ORAL ONCE
Status: DISCONTINUED | OUTPATIENT
Start: 2025-07-05 | End: 2025-07-05 | Stop reason: ALTCHOICE

## 2025-07-05 RX ORDER — GABAPENTIN 300 MG/1
300 CAPSULE ORAL 3 TIMES DAILY
Status: DISCONTINUED | OUTPATIENT
Start: 2025-07-05 | End: 2025-07-06 | Stop reason: DRUGHIGH

## 2025-07-05 RX ORDER — LOSARTAN POTASSIUM 100 MG/1
100 TABLET ORAL DAILY
Status: DISCONTINUED | OUTPATIENT
Start: 2025-07-05 | End: 2025-07-07 | Stop reason: HOSPADM

## 2025-07-05 RX ORDER — CIPROFLOXACIN 2 MG/ML
400 INJECTION, SOLUTION INTRAVENOUS EVERY 12 HOURS
Status: DISCONTINUED | OUTPATIENT
Start: 2025-07-05 | End: 2025-07-07 | Stop reason: HOSPADM

## 2025-07-05 RX ORDER — CIPROFLOXACIN 2 MG/ML
400 INJECTION, SOLUTION INTRAVENOUS ONCE
Status: DISCONTINUED | OUTPATIENT
Start: 2025-07-05 | End: 2025-07-05 | Stop reason: ALTCHOICE

## 2025-07-05 RX ORDER — MAGNESIUM SULFATE IN WATER 40 MG/ML
2000 INJECTION, SOLUTION INTRAVENOUS PRN
Status: DISCONTINUED | OUTPATIENT
Start: 2025-07-05 | End: 2025-07-07 | Stop reason: HOSPADM

## 2025-07-05 RX ORDER — HYDROCHLOROTHIAZIDE 25 MG/1
25 TABLET ORAL DAILY
Status: DISCONTINUED | OUTPATIENT
Start: 2025-07-05 | End: 2025-07-05

## 2025-07-05 RX ORDER — SODIUM CHLORIDE 9 MG/ML
INJECTION, SOLUTION INTRAVENOUS PRN
Status: DISCONTINUED | OUTPATIENT
Start: 2025-07-05 | End: 2025-07-07 | Stop reason: HOSPADM

## 2025-07-05 RX ORDER — PANTOPRAZOLE SODIUM 40 MG/1
40 TABLET, DELAYED RELEASE ORAL
Status: DISCONTINUED | OUTPATIENT
Start: 2025-07-06 | End: 2025-07-07 | Stop reason: HOSPADM

## 2025-07-05 RX ORDER — SODIUM CHLORIDE 0.9 % (FLUSH) 0.9 %
5-40 SYRINGE (ML) INJECTION PRN
Status: DISCONTINUED | OUTPATIENT
Start: 2025-07-05 | End: 2025-07-07 | Stop reason: HOSPADM

## 2025-07-05 RX ADMIN — GABAPENTIN 300 MG: 300 CAPSULE ORAL at 21:31

## 2025-07-05 RX ADMIN — METRONIDAZOLE 500 MG: 500 TABLET ORAL at 13:31

## 2025-07-05 RX ADMIN — FAMOTIDINE 20 MG: 10 INJECTION, SOLUTION INTRAVENOUS at 08:30

## 2025-07-05 RX ADMIN — METRONIDAZOLE 500 MG: 500 INJECTION, SOLUTION INTRAVENOUS at 21:31

## 2025-07-05 RX ADMIN — SODIUM CHLORIDE, PRESERVATIVE FREE 10 ML: 5 INJECTION INTRAVENOUS at 21:31

## 2025-07-05 RX ADMIN — SODIUM CHLORIDE: 0.9 INJECTION, SOLUTION INTRAVENOUS at 21:30

## 2025-07-05 RX ADMIN — ASPIRIN 81 MG: 81 TABLET, CHEWABLE ORAL at 17:13

## 2025-07-05 RX ADMIN — IOPAMIDOL 75 ML: 755 INJECTION, SOLUTION INTRAVENOUS at 09:57

## 2025-07-05 RX ADMIN — FLUOXETINE HYDROCHLORIDE 40 MG: 20 CAPSULE ORAL at 17:13

## 2025-07-05 RX ADMIN — ATORVASTATIN CALCIUM 80 MG: 80 TABLET, FILM COATED ORAL at 17:13

## 2025-07-05 RX ADMIN — CIPROFLOXACIN 400 MG: 400 INJECTION, SOLUTION INTRAVENOUS at 13:36

## 2025-07-05 RX ADMIN — GABAPENTIN 300 MG: 300 CAPSULE ORAL at 17:13

## 2025-07-05 RX ADMIN — TROSPIUM CHLORIDE 20 MG: 20 TABLET, FILM COATED ORAL at 17:13

## 2025-07-05 RX ADMIN — LOSARTAN POTASSIUM 100 MG: 100 TABLET, FILM COATED ORAL at 17:13

## 2025-07-05 RX ADMIN — CIPROFLOXACIN 400 MG: 400 INJECTION, SOLUTION INTRAVENOUS at 23:57

## 2025-07-05 ASSESSMENT — LIFESTYLE VARIABLES
HOW OFTEN DO YOU HAVE A DRINK CONTAINING ALCOHOL: NEVER
HOW MANY STANDARD DRINKS CONTAINING ALCOHOL DO YOU HAVE ON A TYPICAL DAY: PATIENT DOES NOT DRINK

## 2025-07-05 ASSESSMENT — PAIN SCALES - GENERAL
PAINLEVEL_OUTOF10: 5
PAINLEVEL_OUTOF10: 8

## 2025-07-05 ASSESSMENT — PAIN DESCRIPTION - DESCRIPTORS
DESCRIPTORS: ACHING
DESCRIPTORS: ACHING

## 2025-07-05 ASSESSMENT — PAIN DESCRIPTION - LOCATION
LOCATION: CHEST;ABDOMEN
LOCATION: ABDOMEN;CHEST

## 2025-07-05 ASSESSMENT — PAIN - FUNCTIONAL ASSESSMENT
PAIN_FUNCTIONAL_ASSESSMENT: 0-10
PAIN_FUNCTIONAL_ASSESSMENT: PREVENTS OR INTERFERES SOME ACTIVE ACTIVITIES AND ADLS

## 2025-07-05 ASSESSMENT — PAIN DESCRIPTION - ORIENTATION: ORIENTATION: LOWER;MID

## 2025-07-05 NOTE — H&P
Name:  Sophie Wells /Age/Sex: 1950  (75 y.o. female)   MRN & CSN:  [id number] & [id number] Encounter Date/Time: 2025 1:07 PM EDT   Location:  [address] [place] [address] PCP: Henrik Smith MD     Attending:Nito Good MD       Sophie Wells is a 75 y.o. female who presented with     Chief Complaint   Patient presents with    Chest Pain     From home via EMS. Chest pain starting last night, radiating to to abd.  C/o hoarseness, sore throat, diarrhea, nausea x 2-3 days. + Cardiac hx.           Assessment and Plan     Assessment:    Abdominal pain  Nausea, vomiting and diarrhea  Chest pain  Suspected enterocolitis  Type 2 diabetes mellitus  Essential hypertension  Hyperlipidemia  GERD    Plan:      - Patient presented to the hospital with a chief complaints of upper abdominal pain along with nausea vomiting and diarrhea..  CT scan with changes suggestive of enterocolitis of the terminal ileum and cecum.  Started on empiric IV ciprofloxacin and metronidazole pending results.  Stool studies and C. difficile ordered.  General surgery following    -Supportive care with antiemetics as needed.    - Chest pain likely from radiation from abdominal pain.  EKG negative, troponin negative for ischemic changes.  Monitor on telemetry    - Continue home aspirin and statin for CAD    - Continue home hydrochlorothiazide and losartan for hypertension      HPI :      Sophie Wells is a 75 y.o. female with  has a past medical history of CAD (coronary artery disease), Compression fx, lumbar spine (HCC), Diabetes mellitus (HCC), and Hypertension. who presented with chief complaints of diarrhea since last 2 days.  Patient reports having 2 or 3 episodes of loose stool, did not notice any blood in that.  She also noticed some associated nausea that started recently.  Reports abdominal pain is also radiating to the chest.  Denies any active shortness of breath.     Advance care planning was discussed with the patient for

## 2025-07-05 NOTE — ED TRIAGE NOTES
From home via EMS. Chest pain starting last night, radiating to to abd.  C/o hoarseness, sore throat, diarrhea, nausea x 2-3 days. + Cardiac hx.

## 2025-07-05 NOTE — ED PROVIDER NOTES
[facility] EMERGENCY DEPARTMENT  EMERGENCY DEPARTMENT ENCOUNTER      Pt Name: Sophie Wells  MRN: [id number]  Birthdate 1950  Date of evaluation: 7/5/2025  Provider: JANETT Kendall  PCP: Henrik Smith MD  Note Started: 8:10 AM EDT     This patient was also seen and evaluated by the attending physician Stephanie Rosas MD.      CHIEF COMPLAINT       Chief Complaint   Patient presents with    Chest Pain     From home via EMS. Chest pain starting last night, radiating to to abd.  C/o hoarseness, sore throat, diarrhea, nausea x 2-3 days. + Cardiac hx.        HISTORY OF PRESENT ILLNESS   (Location, Timing/Onset, Context/Setting, Quality, Duration, Modifying Factors, Severity, Associated Signs and Symptoms)  Note limiting factors.     Sophie Wells is a 75 y.o. female who presents reporting pain in the middle of the chest and upper abdomen.  She says for the last couple days she has been feeling well, with poor appetite, feeling somewhat weak, little bit feverish, occasionally nauseated.  Really has not eaten much.  Has been having diarrhea as well, nonbloody.  Denies any chronic digestive problems.  She says around 2:00 AM she began having discomfort and in the middle of her chest and upper abdomen, that has been persistent since then.  She has difficulty describing this, saying is not exactly painful, but it is uncomfortable, maybe burning, maybe pressure-like.  She has not vomited.  She denies difficulty breathing.  No unusual cough.  Denies cold symptoms.  Denies any difficulty breathing.  No relevant trauma.  Says she has some incontinence problems with urine and wears a brief, no changes with this.  No known lung disease.  Reports history of coronary artery stenting, long ago, but does not recall any chest pain involved with this.    Nursing Notes were all reviewed and agreed with or any disagreements were addressed in the HPI.    REVIEW OF SYSTEMS    (2-9 systems for level 4, 
the patient's emergency department visit, please see the advanced practice provider's documentation.        Stephanie Rosas MD  07/05/25 [id number]

## 2025-07-05 NOTE — ED NOTES
DYLANAR received form CARRIE Mtz RN. Assumed care of patient for lunch relief.   
injection 40 mg (has no administration in time range)   ondansetron (ZOFRAN-ODT) disintegrating tablet 4 mg (has no administration in time range)     Or   ondansetron (ZOFRAN) injection 4 mg (has no administration in time range)   polyethylene glycol (GLYCOLAX) packet 17 g (has no administration in time range)   acetaminophen (TYLENOL) tablet 650 mg (has no administration in time range)     Or   acetaminophen (TYLENOL) suppository 650 mg (has no administration in time range)   aspirin chewable tablet 81 mg (has no administration in time range)   atorvastatin (LIPITOR) tablet 80 mg (has no administration in time range)   empagliflozin (JARDIANCE) tablet 10 mg (has no administration in time range)   pantoprazole (PROTONIX) tablet 40 mg (has no administration in time range)   gabapentin (NEURONTIN) capsule 300 mg (has no administration in time range)   losartan (COZAAR) tablet 100 mg (has no administration in time range)   trospium (SANCTURA) tablet 20 mg (has no administration in time range)   ciprofloxacin (CIPRO) IVPB 400 mg (has no administration in time range)   metroNIDAZOLE (FLAGYL) 500 mg in 0.9% NaCl 100 mL IVPB premix (has no administration in time range)   FLUoxetine (PROZAC) capsule 40 mg (has no administration in time range)   famotidine (PEPCID) 20 MG/2ML 20 mg in sodium chloride (PF) 0.9 % 10 mL injection (20 mg IntraVENous Given 7/5/25 0830)   iopamidol (ISOVUE-370) 76 % injection 75 mL (75 mLs IntraVENous Given 7/5/25 0957)     Last documented pain medication administration: none  Pertinent or High Risk Medications/Drips: no   If Yes, please provide details:   Blood Product Administration: no  If Yes, please provide details:   Process Protocols/Bundles: N/A    Recommendation  Incomplete STAT orders: none  Overdue Medications: none  Patient Belongings:    Additional Comments:   If any further questions, please call Sending RN at [phone]          Electronically signed by: Electronically signed by

## 2025-07-06 LAB
ANION GAP SERPL CALCULATED.3IONS-SCNC: 9 MMOL/L (ref 3–16)
BASOPHILS # BLD: 0 K/UL (ref 0–0.2)
BASOPHILS NFR BLD: 0.3 %
BUN SERPL-MCNC: 12 MG/DL (ref 7–20)
CALCIUM SERPL-MCNC: 8.7 MG/DL (ref 8.3–10.6)
CHLORIDE SERPL-SCNC: 100 MMOL/L (ref 99–110)
CO2 SERPL-SCNC: 26 MMOL/L (ref 21–32)
CREAT SERPL-MCNC: 0.7 MG/DL (ref 0.6–1.2)
DEPRECATED RDW RBC AUTO: 14.9 % (ref 12.4–15.4)
EKG ATRIAL RATE: 81 BPM
EKG DIAGNOSIS: NORMAL
EKG P AXIS: 15 DEGREES
EKG P-R INTERVAL: 144 MS
EKG Q-T INTERVAL: 408 MS
EKG QRS DURATION: 82 MS
EKG QTC CALCULATION (BAZETT): 473 MS
EKG R AXIS: -30 DEGREES
EKG T AXIS: 50 DEGREES
EKG VENTRICULAR RATE: 81 BPM
EOSINOPHIL # BLD: 0 K/UL (ref 0–0.6)
EOSINOPHIL NFR BLD: 0.6 %
GFR SERPLBLD CREATININE-BSD FMLA CKD-EPI: >90 ML/MIN/{1.73_M2}
GLUCOSE BLD-MCNC: 228 MG/DL (ref 70–99)
GLUCOSE SERPL-MCNC: 109 MG/DL (ref 70–99)
HCT VFR BLD AUTO: 43.4 % (ref 36–48)
HGB BLD-MCNC: 14.6 G/DL (ref 12–16)
LYMPHOCYTES # BLD: 1.1 K/UL (ref 1–5.1)
LYMPHOCYTES NFR BLD: 15.1 %
MCH RBC QN AUTO: 29.1 PG (ref 26–34)
MCHC RBC AUTO-ENTMCNC: 33.5 G/DL (ref 31–36)
MCV RBC AUTO: 87 FL (ref 80–100)
MONOCYTES # BLD: 0.9 K/UL (ref 0–1.3)
MONOCYTES NFR BLD: 13.3 %
NEUTROPHILS # BLD: 5 K/UL (ref 1.7–7.7)
NEUTROPHILS NFR BLD: 70.7 %
PERFORMED ON: ABNORMAL
PLATELET # BLD AUTO: 183 K/UL (ref 135–450)
PMV BLD AUTO: 7.5 FL (ref 5–10.5)
POTASSIUM SERPL-SCNC: 4.1 MMOL/L (ref 3.5–5.1)
RBC # BLD AUTO: 5 M/UL (ref 4–5.2)
SODIUM SERPL-SCNC: 135 MMOL/L (ref 136–145)
WBC # BLD AUTO: 7.1 K/UL (ref 4–11)

## 2025-07-06 PROCEDURE — 36415 COLL VENOUS BLD VENIPUNCTURE: CPT

## 2025-07-06 PROCEDURE — 99222 1ST HOSP IP/OBS MODERATE 55: CPT | Performed by: SURGERY

## 2025-07-06 PROCEDURE — 6360000002 HC RX W HCPCS

## 2025-07-06 PROCEDURE — 2500000003 HC RX 250 WO HCPCS

## 2025-07-06 PROCEDURE — 93010 ELECTROCARDIOGRAM REPORT: CPT | Performed by: INTERNAL MEDICINE

## 2025-07-06 PROCEDURE — 80048 BASIC METABOLIC PNL TOTAL CA: CPT

## 2025-07-06 PROCEDURE — 1200000000 HC SEMI PRIVATE

## 2025-07-06 PROCEDURE — 94760 N-INVAS EAR/PLS OXIMETRY 1: CPT

## 2025-07-06 PROCEDURE — 6370000000 HC RX 637 (ALT 250 FOR IP)

## 2025-07-06 PROCEDURE — 85025 COMPLETE CBC W/AUTO DIFF WBC: CPT

## 2025-07-06 RX ORDER — MULTIVITAMIN WITH IRON
500 TABLET ORAL DAILY
COMMUNITY

## 2025-07-06 RX ORDER — MULTIVIT-MIN/IRON/FOLIC ACID/K 18-600-40
2000 CAPSULE ORAL DAILY
COMMUNITY

## 2025-07-06 RX ORDER — GABAPENTIN 300 MG/1
300 CAPSULE ORAL 3 TIMES DAILY PRN
Status: DISCONTINUED | OUTPATIENT
Start: 2025-07-06 | End: 2025-07-07 | Stop reason: HOSPADM

## 2025-07-06 RX ORDER — MULTIVITAMIN WITH IRON
250 TABLET ORAL DAILY
COMMUNITY

## 2025-07-06 RX ORDER — SOLIFENACIN SUCCINATE 10 MG/1
10 TABLET, FILM COATED ORAL DAILY
COMMUNITY
Start: 2025-06-17 | End: 2025-09-15

## 2025-07-06 RX ORDER — LIRAGLUTIDE 6 MG/ML
1.2 INJECTION SUBCUTANEOUS DAILY
COMMUNITY
Start: 2025-04-01

## 2025-07-06 RX ADMIN — PANTOPRAZOLE SODIUM 40 MG: 40 TABLET, DELAYED RELEASE ORAL at 05:23

## 2025-07-06 RX ADMIN — METRONIDAZOLE 500 MG: 500 INJECTION, SOLUTION INTRAVENOUS at 14:14

## 2025-07-06 RX ADMIN — TROSPIUM CHLORIDE 20 MG: 20 TABLET, FILM COATED ORAL at 05:23

## 2025-07-06 RX ADMIN — EMPAGLIFLOZIN 10 MG: 10 TABLET, FILM COATED ORAL at 08:53

## 2025-07-06 RX ADMIN — FLUOXETINE HYDROCHLORIDE 40 MG: 20 CAPSULE ORAL at 08:53

## 2025-07-06 RX ADMIN — ASPIRIN 81 MG: 81 TABLET, CHEWABLE ORAL at 08:53

## 2025-07-06 RX ADMIN — SODIUM CHLORIDE, PRESERVATIVE FREE 10 ML: 5 INJECTION INTRAVENOUS at 08:55

## 2025-07-06 RX ADMIN — METRONIDAZOLE 500 MG: 500 INJECTION, SOLUTION INTRAVENOUS at 22:37

## 2025-07-06 RX ADMIN — CIPROFLOXACIN 400 MG: 400 INJECTION, SOLUTION INTRAVENOUS at 21:09

## 2025-07-06 RX ADMIN — METRONIDAZOLE 500 MG: 500 INJECTION, SOLUTION INTRAVENOUS at 05:25

## 2025-07-06 RX ADMIN — SODIUM CHLORIDE, PRESERVATIVE FREE 5 ML: 5 INJECTION INTRAVENOUS at 21:27

## 2025-07-06 RX ADMIN — ATORVASTATIN CALCIUM 80 MG: 80 TABLET, FILM COATED ORAL at 08:53

## 2025-07-06 RX ADMIN — ENOXAPARIN SODIUM 40 MG: 100 INJECTION SUBCUTANEOUS at 08:52

## 2025-07-06 RX ADMIN — GABAPENTIN 300 MG: 300 CAPSULE ORAL at 08:53

## 2025-07-06 RX ADMIN — TROSPIUM CHLORIDE 20 MG: 20 TABLET, FILM COATED ORAL at 17:00

## 2025-07-06 RX ADMIN — CIPROFLOXACIN 400 MG: 400 INJECTION, SOLUTION INTRAVENOUS at 12:23

## 2025-07-06 RX ADMIN — LOSARTAN POTASSIUM 100 MG: 100 TABLET, FILM COATED ORAL at 08:53

## 2025-07-06 ASSESSMENT — PAIN SCALES - GENERAL
PAINLEVEL_OUTOF10: 0

## 2025-07-06 NOTE — CONSULTS
PATIENT NAME: Sophie Wells   YOB: 1950    ADMISSION DATE: 7/5/2025  7:56 AM      TODAY'S DATE: 7/6/2025    CHIEF COMPLAINT:  abdominal pain      HISTORY OF PRESENT ILLNESS:  The patient is a 75 y.o. female  who presents with acute onset of upper abdominal pain starting 2 or 3 days ago. Reports nausea, vomiting and diarrhea as well. Some chest pain also noted. Seen in ER last night and workup was negative for acute cardiopulmonary issues. CT of the abdomen shows enterocolitis with dilation of the appendix thought to be secondary to colitis rather than appendicitis. Admitted for IV antibiotics. Surgical consultation requested for abnormal appendix on CT.    CT scan imaging was independently reviewed by me today       Past Medical History:        Diagnosis Date    CAD (coronary artery disease)     Compression fx, lumbar spine (HCC)     L3    Diabetes mellitus (HCC)     Hypertension        Past Surgical History:        Procedure Laterality Date    COLONOSCOPY      CORONARY ANGIOPLASTY WITH STENT PLACEMENT      X 2 2005, 2006    EYE SURGERY Right 9/11/2023    PHACOEMULSIFICATION WITH TORIC INTRAOCULAR LENS IMPLANT - RIGHT EYE performed by Ethan Marte MD at [facility]    EYE SURGERY Left 9/25/2023    PHACOEMULSIFICATION WITH TORIC INTRAOCULAR LENS IMPLANT - LEFT EYE performed by Ethan Marte MD at [facility]    TONSILLECTOMY         Medications Prior to Admission:   Medications Prior to Admission: gabapentin (NEURONTIN) 300 MG capsule, Take 1 capsule by mouth 3 times daily.  FLUoxetine (PROZAC) 40 MG capsule, Take 1 capsule by mouth daily  mirabegron (MYRBETRIQ) 25 MG TB24, Take 1 tablet by mouth daily  dapagliflozin (FARXIGA) 10 MG tablet, Take 1 tablet by mouth every morning  Dulaglutide 0.75 MG/0.5ML SOPN, Inject 0.5 mLs into the skin every 7 days  Insulin Glargine, 2 Unit Dial, 300 UNIT/ML SOPN, Inject into the skin  metoclopramide (REGLAN) 10 MG tablet, Take 1 tablet

## 2025-07-06 NOTE — PLAN OF CARE
Problem: Chronic Conditions and Co-morbidities  Goal: Patient's chronic conditions and co-morbidity symptoms are monitored and maintained or improved  7/6/2025 0907 by Mariposa Jauregui RN  Outcome: Progressing  Flowsheets (Taken 7/6/2025 0853)  Care Plan - Patient's Chronic Conditions and Co-Morbidity Symptoms are Monitored and Maintained or Improved: Monitor and assess patient's chronic conditions and comorbid symptoms for stability, deterioration, or improvement     Problem: Discharge Planning  Goal: Discharge to home or other facility with appropriate resources  7/6/2025 0907 by Mariposa Jauregui RN  Outcome: Progressing  Flowsheets (Taken 7/6/2025 0853)  Discharge to home or other facility with appropriate resources: Identify barriers to discharge with patient and caregiver     Problem: Pain  Goal: Verbalizes/displays adequate comfort level or baseline comfort level  7/6/2025 0907 by Mariposa Jauregui RN  Outcome: Progressing  Flowsheets (Taken 7/6/2025 0740)  Verbalizes/displays adequate comfort level or baseline comfort level: Encourage patient to monitor pain and request assistance     Problem: Safety - Adult  Goal: Free from fall injury  7/6/2025 0907 by Mariposa Jauregui RN  Outcome: Progressing  Flowsheets (Taken 7/6/2025 0904)  Free From Fall Injury: Instruct family/caregiver on patient safety

## 2025-07-06 NOTE — CARE COORDINATION
Case Management Assessment  Initial Evaluation    Date/Time of Evaluation: 7/6/2025 11:38 AM  Assessment Completed by: Jillian Gomez    If patient is discharged prior to next notation, then this note serves as note for discharge by case management.    Patient Name: Sophie Wells                   YOB: 1950  Diagnosis: Ileitis [K52.9]  Abdominal pain [R10.9]  Chest pain, unspecified type [R07.9]                   Date / Time: 7/5/2025  7:56 AM    Patient Admission Status: Inpatient   Readmission Risk (Low < 19, Mod (19-27), High > 27): Readmission Risk Score: 9.9    Current PCP: Henrik Smith MD  PCP verified by CM? Yes    Chart Reviewed: Yes      History Provided by: Patient  Patient Orientation: Alert and Oriented    Patient Cognition: Alert    Hospitalization in the last 30 days (Readmission):  No    If yes, Readmission Assessment in CM Navigator will be completed.    Advance Directives:      Code Status: Full Code   Patient's Primary Decision Maker is: Legal Next of Kin    Primary Decision Maker: Luis Manuel Wells - Spouse - [phone]    Discharge Planning:    Patient lives with: Spouse/Significant Other Type of Home: House  Primary Care Giver: Self  Patient Support Systems include: Spouse/Significant Other, Family Members   Current Financial resources: Medicare  Current community resources: None  Current services prior to admission: Durable Medical Equipment            Current DME: Cane, Walker, Shower Chair, Other (Comment) (raised toilet seat)            Type of Home Care services:  None    ADLS  Prior functional level: Independent in ADLs/IADLs  Current functional level: Independent in ADLs/IADLs    PT AM-PAC:   /24  OT AM-PAC:   /24    Family can provide assistance at DC: Yes  Would you like Case Management to discuss the discharge plan with any other family members/significant others, and if so, who? No  Plans to Return to Present Housing: Yes  Other Identified Issues/Barriers

## 2025-07-06 NOTE — PLAN OF CARE
Problem: Chronic Conditions and Co-morbidities  Goal: Patient's chronic conditions and co-morbidity symptoms are monitored and maintained or improved  Outcome: Progressing  Flowsheets (Taken 7/5/2025 2034)  Care Plan - Patient's Chronic Conditions and Co-Morbidity Symptoms are Monitored and Maintained or Improved: Monitor and assess patient's chronic conditions and comorbid symptoms for stability, deterioration, or improvement     Problem: Discharge Planning  Goal: Discharge to home or other facility with appropriate resources  Outcome: Progressing  Flowsheets (Taken 7/5/2025 2034)  Discharge to home or other facility with appropriate resources: Identify barriers to discharge with patient and caregiver     Problem: Pain  Goal: Verbalizes/displays adequate comfort level or baseline comfort level  Outcome: Progressing     Problem: Safety - Adult  Goal: Free from fall injury  Outcome: Progressing  Flowsheets (Taken 7/5/2025 2351)  Free From Fall Injury: Instruct family/caregiver on patient safety

## 2025-07-06 NOTE — CARE COORDINATION
Advance Care Planning   Healthcare Decision Maker:    Primary Decision Maker: Luis Manuel Wells - Spouse - [phone]      Today we documented Decision Maker(s) consistent with Legal Next of Kin hierarchy.        Electronically signed by Jillian Gomez on 7/6/2025 at 11:38 AM

## 2025-07-07 VITALS
TEMPERATURE: 98.2 F | BODY MASS INDEX: 30.47 KG/M2 | DIASTOLIC BLOOD PRESSURE: 78 MMHG | OXYGEN SATURATION: 98 % | SYSTOLIC BLOOD PRESSURE: 146 MMHG | WEIGHT: 171.96 LBS | RESPIRATION RATE: 16 BRPM | HEIGHT: 63 IN | HEART RATE: 71 BPM

## 2025-07-07 LAB
ANION GAP SERPL CALCULATED.3IONS-SCNC: 10 MMOL/L (ref 3–16)
BASOPHILS # BLD: 0 K/UL (ref 0–0.2)
BASOPHILS NFR BLD: 0.4 %
BUN SERPL-MCNC: 16 MG/DL (ref 7–20)
CALCIUM SERPL-MCNC: 8.3 MG/DL (ref 8.3–10.6)
CHLORIDE SERPL-SCNC: 105 MMOL/L (ref 99–110)
CO2 SERPL-SCNC: 21 MMOL/L (ref 21–32)
CREAT SERPL-MCNC: 0.7 MG/DL (ref 0.6–1.2)
DEPRECATED RDW RBC AUTO: 14.6 % (ref 12.4–15.4)
EOSINOPHIL # BLD: 0.1 K/UL (ref 0–0.6)
EOSINOPHIL NFR BLD: 1.6 %
GFR SERPLBLD CREATININE-BSD FMLA CKD-EPI: >90 ML/MIN/{1.73_M2}
GLUCOSE SERPL-MCNC: 159 MG/DL (ref 70–99)
HCT VFR BLD AUTO: 39.8 % (ref 36–48)
HGB BLD-MCNC: 13.5 G/DL (ref 12–16)
LYMPHOCYTES # BLD: 1.4 K/UL (ref 1–5.1)
LYMPHOCYTES NFR BLD: 25.1 %
MCH RBC QN AUTO: 29.2 PG (ref 26–34)
MCHC RBC AUTO-ENTMCNC: 34 G/DL (ref 31–36)
MCV RBC AUTO: 86.1 FL (ref 80–100)
MONOCYTES # BLD: 0.7 K/UL (ref 0–1.3)
MONOCYTES NFR BLD: 12 %
NEUTROPHILS # BLD: 3.4 K/UL (ref 1.7–7.7)
NEUTROPHILS NFR BLD: 60.9 %
PLATELET # BLD AUTO: 167 K/UL (ref 135–450)
PMV BLD AUTO: 7.3 FL (ref 5–10.5)
POTASSIUM SERPL-SCNC: 3.6 MMOL/L (ref 3.5–5.1)
RBC # BLD AUTO: 4.62 M/UL (ref 4–5.2)
SODIUM SERPL-SCNC: 136 MMOL/L (ref 136–145)
WBC # BLD AUTO: 5.5 K/UL (ref 4–11)

## 2025-07-07 PROCEDURE — 36415 COLL VENOUS BLD VENIPUNCTURE: CPT

## 2025-07-07 PROCEDURE — 80048 BASIC METABOLIC PNL TOTAL CA: CPT

## 2025-07-07 PROCEDURE — 85025 COMPLETE CBC W/AUTO DIFF WBC: CPT

## 2025-07-07 PROCEDURE — 94760 N-INVAS EAR/PLS OXIMETRY 1: CPT

## 2025-07-07 PROCEDURE — 6360000002 HC RX W HCPCS

## 2025-07-07 PROCEDURE — 2500000003 HC RX 250 WO HCPCS

## 2025-07-07 PROCEDURE — 6370000000 HC RX 637 (ALT 250 FOR IP)

## 2025-07-07 RX ORDER — CIPROFLOXACIN 500 MG/1
500 TABLET, FILM COATED ORAL 2 TIMES DAILY
Qty: 14 TABLET | Refills: 0 | Status: SHIPPED | OUTPATIENT
Start: 2025-07-07 | End: 2025-07-14

## 2025-07-07 RX ORDER — METRONIDAZOLE 500 MG/1
500 TABLET ORAL 2 TIMES DAILY
Qty: 14 TABLET | Refills: 0 | Status: SHIPPED | OUTPATIENT
Start: 2025-07-07 | End: 2025-07-14

## 2025-07-07 RX ADMIN — ASPIRIN 81 MG: 81 TABLET, CHEWABLE ORAL at 08:48

## 2025-07-07 RX ADMIN — TROSPIUM CHLORIDE 20 MG: 20 TABLET, FILM COATED ORAL at 08:48

## 2025-07-07 RX ADMIN — FLUOXETINE HYDROCHLORIDE 40 MG: 20 CAPSULE ORAL at 08:48

## 2025-07-07 RX ADMIN — ATORVASTATIN CALCIUM 80 MG: 80 TABLET, FILM COATED ORAL at 08:48

## 2025-07-07 RX ADMIN — LOSARTAN POTASSIUM 100 MG: 100 TABLET, FILM COATED ORAL at 08:48

## 2025-07-07 RX ADMIN — PANTOPRAZOLE SODIUM 40 MG: 40 TABLET, DELAYED RELEASE ORAL at 08:48

## 2025-07-07 RX ADMIN — METRONIDAZOLE 500 MG: 500 INJECTION, SOLUTION INTRAVENOUS at 04:49

## 2025-07-07 RX ADMIN — ENOXAPARIN SODIUM 40 MG: 100 INJECTION SUBCUTANEOUS at 08:48

## 2025-07-07 RX ADMIN — SODIUM CHLORIDE, PRESERVATIVE FREE 10 ML: 5 INJECTION INTRAVENOUS at 08:49

## 2025-07-07 RX ADMIN — EMPAGLIFLOZIN 10 MG: 10 TABLET, FILM COATED ORAL at 08:48

## 2025-07-07 ASSESSMENT — PAIN SCALES - GENERAL
PAINLEVEL_OUTOF10: 0
PAINLEVEL_OUTOF10: 0

## 2025-07-07 NOTE — PLAN OF CARE
Problem: Chronic Conditions and Co-morbidities  Goal: Patient's chronic conditions and co-morbidity symptoms are monitored and maintained or improved  7/6/2025 2303 by Tl Sanchez RN  Outcome: Progressing  7/6/2025 0907 by Mariposa Jauregui RN  Outcome: Progressing  Flowsheets (Taken 7/6/2025 0853)  Care Plan - Patient's Chronic Conditions and Co-Morbidity Symptoms are Monitored and Maintained or Improved: Monitor and assess patient's chronic conditions and comorbid symptoms for stability, deterioration, or improvement     Problem: Discharge Planning  Goal: Discharge to home or other facility with appropriate resources  7/6/2025 2303 by Tl Sanchez RN  Outcome: Progressing  7/6/2025 0907 by Mariposa Jauregui RN  Outcome: Progressing  Flowsheets (Taken 7/6/2025 0853)  Discharge to home or other facility with appropriate resources: Identify barriers to discharge with patient and caregiver     Problem: Pain  Goal: Verbalizes/displays adequate comfort level or baseline comfort level  7/6/2025 2303 by Tl Sanchez RN  Outcome: Progressing  7/6/2025 0907 by Mariposa Jauregui RN  Outcome: Progressing  Flowsheets (Taken 7/6/2025 0740)  Verbalizes/displays adequate comfort level or baseline comfort level: Encourage patient to monitor pain and request assistance     Problem: Safety - Adult  Goal: Free from fall injury  7/6/2025 2303 by Tl Sanchez RN  Outcome: Progressing  7/6/2025 0907 by Mariposa Jauregui RN  Outcome: Progressing  Flowsheets (Taken 7/6/2025 0904)  Free From Fall Injury: Instruct family/caregiver on patient safety

## 2025-07-07 NOTE — PLAN OF CARE
Problem: Chronic Conditions and Co-morbidities  Goal: Patient's chronic conditions and co-morbidity symptoms are monitored and maintained or improved  7/7/2025 1120 by Chelsi Whitehead RN  Outcome: Progressing  7/6/2025 2303 by Tl Sanchez RN  Outcome: Progressing     Problem: Discharge Planning  Goal: Discharge to home or other facility with appropriate resources  7/7/2025 1120 by Chelsi Whitehead RN  Outcome: Progressing  7/6/2025 2303 by Tl Sanchez RN  Outcome: Progressing     Problem: Pain  Goal: Verbalizes/displays adequate comfort level or baseline comfort level  7/7/2025 1120 by Chelsi Whitehead RN  Outcome: Progressing  7/6/2025 2303 by Tl Sanchez RN  Outcome: Progressing     Problem: Safety - Adult  Goal: Free from fall injury  7/7/2025 1120 by Chelsi Whitehead RN  Outcome: Progressing  7/6/2025 2303 by Tl Sanchez RN  Outcome: Progressing

## 2025-07-07 NOTE — PLAN OF CARE
Problem: Chronic Conditions and Co-morbidities  Goal: Patient's chronic conditions and co-morbidity symptoms are monitored and maintained or improved  7/7/2025 1250 by Roro Menon RN  Outcome: Completed  7/7/2025 1120 by Chelsi Whitehead RN  Outcome: Progressing  7/6/2025 2303 by Tl Sanchez RN  Outcome: Progressing     Problem: Discharge Planning  Goal: Discharge to home or other facility with appropriate resources  7/7/2025 1250 by Roro Menon RN  Outcome: Completed  7/7/2025 1120 by Chelsi Whitehead RN  Outcome: Progressing  7/6/2025 2303 by Tl Sanchez RN  Outcome: Progressing     Problem: Pain  Goal: Verbalizes/displays adequate comfort level or baseline comfort level  7/7/2025 1250 by Roro Menon RN  Outcome: Completed  7/7/2025 1120 by Chelsi Whitehead RN  Outcome: Progressing  7/6/2025 2303 by Tl Sanchez RN  Outcome: Progressing     Problem: Safety - Adult  Goal: Free from fall injury  7/7/2025 1250 by Roro Menon RN  Outcome: Completed  7/7/2025 1120 by Chelsi Whitehead RN  Outcome: Progressing  7/6/2025 2303 by Tl Sanchez RN  Outcome: Progressing

## 2025-07-07 NOTE — CARE COORDINATION
CASE MANAGEMENT DISCHARGE SUMMARY: Discharge order noted. Returning to home w/spouse. No needs identified.     DISCHARGE DATE: 7/72025    DISCHARGED TO HOME     TRANSPORTATION: Spouse             TIME: Afternoon     PREFERRED PHARMACY: [facility]        Du BRUNSON, RN  Case Management  [phone]             Electronically signed by Du Shrestha RN on 7/7/2025 at 11:11 AM

## 2025-07-07 NOTE — DISCHARGE INSTR - COC
Continuity of Care Form    Patient Name: Sophie Wells   :  1950  MRN:  [id number]    Admit date:  2025  Discharge date:  ***    Code Status Order: DNR-CCA   Advance Directives:     Admitting Physician:  Nito Good MD  PCP: Henrik Smith MD    Discharging Nurse: ***  Discharging Hospital Unit/Room#: W6O-7055/4105-01  Discharging Unit Phone Number: ***    Emergency Contact:   Extended Emergency Contact Information  Primary Emergency Contact: Luis Manuel Wells  Address: [place] [address]  Home Phone: [phone]  Mobile Phone: [phone]  Relation: Spouse   needed? No  Secondary Emergency Contact: Henrik Lott   [facility]  Home Phone: [phone]  Relation: Brother/Sister   needed? No    Past Surgical History:  Past Surgical History:   Procedure Laterality Date    COLONOSCOPY      CORONARY ANGIOPLASTY WITH STENT PLACEMENT      X 2 ,     EYE SURGERY Right 2023    PHACOEMULSIFICATION WITH TORIC INTRAOCULAR LENS IMPLANT - RIGHT EYE performed by Ethan Marte MD at [facility]    EYE SURGERY Left 2023    PHACOEMULSIFICATION WITH TORIC INTRAOCULAR LENS IMPLANT - LEFT EYE performed by Ethan Marte MD at [facility]    TONSILLECTOMY         Immunization History:   Immunization History   Administered Date(s) Administered    COVID-19, MODERNA, , (age 12y+), IM, 50mcg/0.5mL 2023, 2024    COVID-19, PFIZER Bivalent, DO NOT Dilute, (age 12y+), IM, 30 mcg/0.3 mL 2022    COVID-19, PFIZER GRAY top, DO NOT Dilute, (age 12 y+), IM, 30 mcg/0.3 mL 2022    COVID-19, PFIZER PURPLE top, DILUTE for use, (age 12 y+), 30mcg/0.3mL 2021, 2021, 10/02/2021    TDaP, ADACEL (age 10y-64y), BOOSTRIX (age 10y+), IM, 0.5mL 2015       Active Problems:  Patient Active Problem List   Diagnosis Code    Abdominal pain R10.9       Isolation/Infection:   Isolation

## 2025-07-08 NOTE — PROGRESS NOTES
Physician Progress Note      PATIENT:               JOON SAEED  CSN #:                  [id number]  :                       1950  ADMIT DATE:       2025 7:56 AM  DISCH DATE:        2025 2:07 PM  RESPONDING  PROVIDER #:        Nito Good MD          QUERY TEXT:    Colitis is documented in the MD notes 25.  Please specify the type of   colitis such as:    The clinical indicators include:  -Consult notes  \"Dilated appendix:  Secondary to underlying colitis. Agree   with antibiotics, IV for now with discharge on PO\"  -CBC, CMP, CT of abdomen, Gen. surgery consult,  -Meds-Flagyl, Cipro  Options provided:  -- Bacterial Colitis  -- Other - I will add my own diagnosis  -- Disagree - Not applicable / Not valid  -- Disagree - Clinically unable to determine / Unknown  -- Refer to Clinical Documentation Reviewer    PROVIDER RESPONSE TEXT:    This patient has bacterial colitis.    Query created by: Taylor Dejesus on 2025 12:27 PM      Electronically signed by:  Nito Good MD 2025 9:08 AM          
General Surgery    Called regarding abnormal appendix on CT    Reviewed and she has a regional enterocolitis of the terminal ileum and cecum which includes the appendix    This does not represent acute appendicitis and does not require appendectomy    Recommend treatment as any other enteritis with antibiotics    If admitted we will follow along    Electronically signed by ROCK ARELLANO MD on 7/5/2025 at 12:24 PM    
Pharmacy Medication Reconciliation Note     List of medications patient is currently taking is complete.    Source of information:   1. Discussion with patient  2. Epic records (dispense report)    Notes regarding home medications:   1. Medication list up to date-several changes made.   2. Added victoza, solifenacin, vitamin D, magnesium.  3. Adjusted to gabapenin 300 mg TID PRN (from [facility])  4. Removed Trulicity, TAL Doll, PharmD, BCPS  7/6/2025 11:05 AM  
Reviewed discharge paperwork with patient who verbalized understanding and denies additional questions and needs. IV removed with catheter intact, a dressing applied with no drainage noted. Patient to lobby for discharge to home.  Electronically signed by Chelsi Whitehead RN on 7/7/2025 at 2:07 PM   
cardiopulmonary disease.       Comment: Please note this report has been produced using speech recognition software and may contain errors related to that system including errors in grammar, punctuation, and spelling, as well as words and phrases that may be inappropriate. If there are any questions or concerns, please feel free to contact the dictating provider for clarification.     Total time spent with the patient today was >50 minutes. This includes a comprehensive review of the patient's condition, detailed discussions regarding treatment plans, management options, and progress, time spent on coordination of care with other providers, reviewing diagnostic results, and addressing any patient/family concerns     Nito Good MD

## 2025-07-12 NOTE — DISCHARGE SUMMARY
Name:  Sophie Wells /Age/Sex: 1950 (75 y.o. female)   Admit Date: 2025  Discharge Date: 2025    MRN & CSN:  [id number] & [id number] Encounter Date : 2025    Attending:  No att. providers found Discharging Provider: Nito Good MD     Hospital Course:      Sophie Wells is a 75 y.o. female who presented with     Chief Complaint   Patient presents with    Chest Pain     From home via EMS. Chest pain starting last night, radiating to to abd.  C/o hoarseness, sore throat, diarrhea, nausea x 2-3 days. + Cardiac hx.         The patient was being managed in the hospital for    Assessment:     Abdominal pain  Nausea, vomiting and diarrhea  Chest pain  Suspected enterocolitis  Type 2 diabetes mellitus  Essential hypertension  Hyperlipidemia  GERD     Plan:       - Patient presented to the hospital with a chief complaints of upper abdominal pain along with nausea vomiting and diarrhea..  CT scan with changes suggestive of enterocolitis of the terminal ileum and cecum.  Received empiric IV ciprofloxacin and metronidazole and was discharged on oral ciprofloxacin and metronidazole to continue course.  Stool studies and C. difficile ordered not collected.  Diet was advanced to regular diet and patient was able to tolerated well.  General surgery recommended no other intervention     -Supportive care with antiemetics as needed.     - Chest pain likely from radiation from abdominal pain and now resolving.  EKG negative, troponin negative for ischemic changes.  Monitored on telemetry without any irregularities.     - Continue home aspirin and statin for CAD     - Continue home hydrochlorothiazide and losartan for hypertension    On the basis of discharge, patient reported feeling stable. The patient was found to not be in any acute distress. Further, the patient expressed appropriate understanding of, and agreement with, the discharge recommendations, medications and plan.     Follow up appointments

## (undated) DEVICE — SURGICAL PROCEDURE PACK PIK PPK374205] ALCON LABORATORIES INC]

## (undated) DEVICE — MARKER,SKIN,WI/RULER AND LABELS: Brand: MEDLINE

## (undated) DEVICE — SYRINGE TB 1ML NDL 25GA L0.625IN PLAS SLIP TIP CONVENTIONAL

## (undated) DEVICE — Device: Brand: MEDEX

## (undated) DEVICE — SURGICAL PROC PACK SHT WEST V4

## (undated) DEVICE — SOLUTION IV IRRIG WATER 500ML POUR BRL ST 2F7113

## (undated) DEVICE — SYRINGE MED 30ML STD CLR PLAS LUERLOCK TIP N CTRL DISP

## (undated) DEVICE — WIPE INSTR W73XL73CM VISITEC

## (undated) DEVICE — SOLUTION IRRIG BSS ST 500ML

## (undated) DEVICE — Z DISC NO SUB GLOVE SURG SZ 85 L12IN FNGR THK87MIL WHT LTX FREE

## (undated) DEVICE — SYRINGE MED 3ML CLR PLAS STD N CTRL LUERLOCK TIP DISP